# Patient Record
Sex: MALE | Race: ASIAN | NOT HISPANIC OR LATINO | URBAN - METROPOLITAN AREA
[De-identification: names, ages, dates, MRNs, and addresses within clinical notes are randomized per-mention and may not be internally consistent; named-entity substitution may affect disease eponyms.]

---

## 2017-01-20 ENCOUNTER — EMERGENCY (EMERGENCY)
Facility: HOSPITAL | Age: 57
LOS: 1 days | Discharge: HOME | End: 2017-01-20
Attending: EMERGENCY MEDICINE | Admitting: EMERGENCY MEDICINE
Payer: COMMERCIAL

## 2017-01-20 VITALS
SYSTOLIC BLOOD PRESSURE: 132 MMHG | DIASTOLIC BLOOD PRESSURE: 92 MMHG | TEMPERATURE: 98 F | OXYGEN SATURATION: 98 % | RESPIRATION RATE: 18 BRPM | HEART RATE: 73 BPM

## 2017-01-20 VITALS
SYSTOLIC BLOOD PRESSURE: 141 MMHG | HEART RATE: 74 BPM | OXYGEN SATURATION: 98 % | DIASTOLIC BLOOD PRESSURE: 87 MMHG | RESPIRATION RATE: 16 BRPM

## 2017-01-20 DIAGNOSIS — R91.1 SOLITARY PULMONARY NODULE: Chronic | ICD-10-CM

## 2017-01-20 LAB
ALBUMIN SERPL ELPH-MCNC: 4 G/DL — SIGNIFICANT CHANGE UP (ref 3.3–5)
ALP SERPL-CCNC: 90 U/L — SIGNIFICANT CHANGE UP (ref 40–120)
ALT FLD-CCNC: 13 U/L — SIGNIFICANT CHANGE UP (ref 4–41)
APTT BLD: 27.6 SEC — SIGNIFICANT CHANGE UP (ref 27.5–37.4)
AST SERPL-CCNC: 17 U/L — SIGNIFICANT CHANGE UP (ref 4–40)
BASE EXCESS BLDV CALC-SCNC: 6 MMOL/L — SIGNIFICANT CHANGE UP
BASOPHILS # BLD AUTO: 0.02 K/UL — SIGNIFICANT CHANGE UP (ref 0–0.2)
BASOPHILS NFR BLD AUTO: 0.4 % — SIGNIFICANT CHANGE UP (ref 0–2)
BILIRUB SERPL-MCNC: 0.2 MG/DL — SIGNIFICANT CHANGE UP (ref 0.2–1.2)
BLD GP AB SCN SERPL QL: NEGATIVE — SIGNIFICANT CHANGE UP
BLOOD GAS VENOUS - CREATININE: 0.92 MG/DL — SIGNIFICANT CHANGE UP (ref 0.5–1.3)
BUN SERPL-MCNC: 18 MG/DL — SIGNIFICANT CHANGE UP (ref 7–23)
CALCIUM SERPL-MCNC: 8.9 MG/DL — SIGNIFICANT CHANGE UP (ref 8.4–10.5)
CHLORIDE BLDV-SCNC: 107 MMOL/L — SIGNIFICANT CHANGE UP (ref 96–108)
CHLORIDE SERPL-SCNC: 104 MMOL/L — SIGNIFICANT CHANGE UP (ref 98–107)
CO2 SERPL-SCNC: 27 MMOL/L — SIGNIFICANT CHANGE UP (ref 22–31)
CREAT SERPL-MCNC: 0.94 MG/DL — SIGNIFICANT CHANGE UP (ref 0.5–1.3)
EOSINOPHIL # BLD AUTO: 0.08 K/UL — SIGNIFICANT CHANGE UP (ref 0–0.5)
EOSINOPHIL NFR BLD AUTO: 1.5 % — SIGNIFICANT CHANGE UP (ref 0–6)
GAS PNL BLDV: 137 MMOL/L — SIGNIFICANT CHANGE UP (ref 136–146)
GLUCOSE BLDV-MCNC: 105 — HIGH (ref 70–99)
GLUCOSE SERPL-MCNC: 107 MG/DL — HIGH (ref 70–99)
HCO3 BLDV-SCNC: 28 MMOL/L — HIGH (ref 20–27)
HCT VFR BLD CALC: 42.5 % — SIGNIFICANT CHANGE UP (ref 39–50)
HCT VFR BLDV CALC: 43.8 % — SIGNIFICANT CHANGE UP (ref 39–51)
HGB BLD-MCNC: 13.8 G/DL — SIGNIFICANT CHANGE UP (ref 13–17)
HGB BLDV-MCNC: 14.3 G/DL — SIGNIFICANT CHANGE UP (ref 13–17)
IMM GRANULOCYTES NFR BLD AUTO: 0.2 % — SIGNIFICANT CHANGE UP (ref 0–1.5)
INR BLD: 0.87 — SIGNIFICANT CHANGE UP (ref 0.87–1.18)
LACTATE BLDV-MCNC: 1.3 MMOL/L — SIGNIFICANT CHANGE UP (ref 0.5–2)
LYMPHOCYTES # BLD AUTO: 1.49 K/UL — SIGNIFICANT CHANGE UP (ref 1–3.3)
LYMPHOCYTES # BLD AUTO: 28.2 % — SIGNIFICANT CHANGE UP (ref 13–44)
MCHC RBC-ENTMCNC: 28.5 PG — SIGNIFICANT CHANGE UP (ref 27–34)
MCHC RBC-ENTMCNC: 32.5 % — SIGNIFICANT CHANGE UP (ref 32–36)
MCV RBC AUTO: 87.6 FL — SIGNIFICANT CHANGE UP (ref 80–100)
MONOCYTES # BLD AUTO: 0.39 K/UL — SIGNIFICANT CHANGE UP (ref 0–0.9)
MONOCYTES NFR BLD AUTO: 7.4 % — SIGNIFICANT CHANGE UP (ref 2–14)
NEUTROPHILS # BLD AUTO: 3.3 K/UL — SIGNIFICANT CHANGE UP (ref 1.8–7.4)
NEUTROPHILS NFR BLD AUTO: 62.3 % — SIGNIFICANT CHANGE UP (ref 43–77)
PCO2 BLDV: 53 MMHG — HIGH (ref 41–51)
PH BLDV: 7.38 PH — SIGNIFICANT CHANGE UP (ref 7.32–7.43)
PLATELET # BLD AUTO: 201 K/UL — SIGNIFICANT CHANGE UP (ref 150–400)
PMV BLD: 9.6 FL — SIGNIFICANT CHANGE UP (ref 7–13)
PO2 BLDV: 32 MMHG — LOW (ref 35–40)
POTASSIUM BLDV-SCNC: 3.8 MMOL/L — SIGNIFICANT CHANGE UP (ref 3.4–4.5)
POTASSIUM SERPL-MCNC: 4.2 MMOL/L — SIGNIFICANT CHANGE UP (ref 3.5–5.3)
POTASSIUM SERPL-SCNC: 4.2 MMOL/L — SIGNIFICANT CHANGE UP (ref 3.5–5.3)
PROT SERPL-MCNC: 7.1 G/DL — SIGNIFICANT CHANGE UP (ref 6–8.3)
PROTHROM AB SERPL-ACNC: 9.9 SEC — LOW (ref 10–13.1)
RBC # BLD: 4.85 M/UL — SIGNIFICANT CHANGE UP (ref 4.2–5.8)
RBC # FLD: 13.7 % — SIGNIFICANT CHANGE UP (ref 10.3–14.5)
RH IG SCN BLD-IMP: POSITIVE — SIGNIFICANT CHANGE UP
SAO2 % BLDV: 56.2 % — LOW (ref 60–85)
SODIUM SERPL-SCNC: 142 MMOL/L — SIGNIFICANT CHANGE UP (ref 135–145)
WBC # BLD: 5.29 K/UL — SIGNIFICANT CHANGE UP (ref 3.8–10.5)
WBC # FLD AUTO: 5.29 K/UL — SIGNIFICANT CHANGE UP (ref 3.8–10.5)

## 2017-01-20 PROCEDURE — 99284 EMERGENCY DEPT VISIT MOD MDM: CPT | Mod: 25

## 2017-01-20 PROCEDURE — 71250 CT THORAX DX C-: CPT | Mod: 26

## 2017-01-20 PROCEDURE — 71020: CPT | Mod: 26

## 2017-01-20 PROCEDURE — 93010 ELECTROCARDIOGRAM REPORT: CPT

## 2017-01-20 RX ORDER — CIPROFLOXACIN LACTATE 400MG/40ML
1 VIAL (ML) INTRAVENOUS
Qty: 10 | Refills: 0 | OUTPATIENT
Start: 2017-01-20 | End: 2017-01-30

## 2017-01-20 NOTE — ED ADULT TRIAGE NOTE - CHIEF COMPLAINT QUOTE
Pt c/o coughing up bright red blood since this AM. C/o mild left sided CP when breathing. Denies clots.  States hx Lung CA, had Lung sx for removal of L lower lung 9/30/16 and PNA in october 2016. Pt had bronchoscopy apprx 1 week ago. Denies fever/chills, SOB. Hx: gastric CA, Lung CA. Respirations even/unlabored. Pt c/o coughing up bright red blood since this AM. C/o mild left sided CP when breathing. Denies clots.  States hx Lung CA, had Lung sx for removal of L lower lung 9/30/16 and PNA in october 2016. Pt had bronchoscopy apprx 1 week ago. Denies fever/chills, SOB. Hx: gastric CA, Lung CA. Not currently on chemo. Respirations even/unlabored.

## 2017-01-20 NOTE — ED PROVIDER NOTE - MEDICAL DECISION MAKING DETAILS
57 y/o M with h/o non-small cell lung carcinoma s/p resection presents with hemoptysis. Likely related to patient's wedge resection and bronchoscopy. VSS. NAD. cbc, cmp, coags, CXR. CT surgery

## 2017-01-20 NOTE — ED ADULT NURSE NOTE - OBJECTIVE STATEMENT
Received pt in spot 18. [id number]. C/o a few episodes of coughing up bright red blood this AM. Pt states he went for bronchoscopy last week for persistent cough since lung resection (9/16). Pt states he has persistent pain at left lower lung resection site but denies increased pain today. Vs as noted. Denies sob, dizziness. VS as noted. IV placed, labs sent. Will monitor.

## 2017-01-20 NOTE — ED PROVIDER NOTE - OBJECTIVE STATEMENT
57 y/o M with h/o non-small cell carcinoma s/p LLL wedge resection 09/30/2016 presents with hemoptysis. Patient began having hemoptysis at 430 am. States that he has been having intermittent hemoptysis since his wedge resection but this is the largest amount. Also complaining of left sided chest pain which he has had since his surgery. Patient had a bronchoscopy done one week ago after he had CT showing recurrent nodule in LLL but biopsy was benign. PAtient's CT surgeon is Dr. Timo Ng at [facility].

## 2017-01-20 NOTE — ED ADULT NURSE REASSESSMENT NOTE - NS ED NURSE REASSESS COMMENT FT1
pt received from Joanna MARTÍNEZ in no acute distress. PT speaking full sentences with stable vitals, LS clear= bilat in no resp distress. will CTM

## 2017-01-20 NOTE — ED PROVIDER NOTE - PROGRESS NOTE DETAILS
Will order ct, signed out patient, plan to admit. [place] Alanis: Pt signed out to me at 8am. Plan to follow up ct chest results and follow up with ct surgery. Will continue to follow. [place] Alanis: Ct showing signs of possible PNA. Ct surgery recommends pt stable for discharge on levaquin 500 daily for 10 days and pt to follow up with Dr. jerilyn mccrary as soon as possible. Pt advised to return to the ED if symptoms worsen- worsening bleeding.

## 2017-01-20 NOTE — ED PROVIDER NOTE - ATTENDING CONTRIBUTION TO CARE
att55 y/o m with h/o Lung CA, s/p resection , no chemo, PNA, gastric CA, presents with hemoptysis for months, usually in the am. Increased amount of bright red blood with clot today. In the past took augmentin with improved hemoptysis. Pt had recent bronch. Pt with L sided chest pain since his surgery, was told it was post op pain. Well appearing. No bleeding in room. clear lungs. Plan for labs, xr, c/s ct surgery and reassessment. Plan to admit. Not consistent with pe with symptoms for months. Will sign out patient pending ct and ct surg c/s.   I have personally performed a face to face bedside history and physical examination of this patient. I have discussed the history, examination, review of systems, assessment and plan of management with the resident. I have reviewed the electronic medical record and amended it to reflect my history, review of systems, physical exam, assessment and plan.

## 2017-10-24 NOTE — ED ADULT NURSE NOTE - CHIEF COMPLAINT QUOTE
Pt c/o coughing up bright red blood since this AM. C/o mild left sided CP when breathing. Denies clots.  States hx Lung CA, had Lung sx for removal of L lower lung 9/30/16 and PNA in october 2016. Pt had bronchoscopy apprx 1 week ago. Denies fever/chills, SOB. Hx: gastric CA, Lung CA. Not currently on chemo. Respirations even/unlabored.
hair removal not indicated

## 2021-07-30 NOTE — ED PROVIDER NOTE - CHPI ED SYMPTOMS NEG
Secondary to severe anemia and bacteremia   iron transfusion   IV abx for bacteremia   Monitor on tele  TTE no fever/no chills

## 2022-08-03 ENCOUNTER — CONSULT (OUTPATIENT)
Dept: INTERNAL MEDICINE CLINIC | Facility: CLINIC | Age: 62
End: 2022-08-03
Payer: COMMERCIAL

## 2022-08-03 VITALS
WEIGHT: 164.2 LBS | HEIGHT: 72 IN | HEART RATE: 81 BPM | SYSTOLIC BLOOD PRESSURE: 100 MMHG | OXYGEN SATURATION: 98 % | DIASTOLIC BLOOD PRESSURE: 70 MMHG | BODY MASS INDEX: 22.24 KG/M2

## 2022-08-03 DIAGNOSIS — Z00.00 ANNUAL PHYSICAL EXAM: ICD-10-CM

## 2022-08-03 DIAGNOSIS — K22.719 BARRETT'S ESOPHAGUS WITH DYSPLASIA: Primary | ICD-10-CM

## 2022-08-03 DIAGNOSIS — C34.90 MALIGNANT NEOPLASM OF LUNG, UNSPECIFIED LATERALITY, UNSPECIFIED PART OF LUNG (HCC): ICD-10-CM

## 2022-08-03 DIAGNOSIS — Z11.59 NEED FOR HEPATITIS C SCREENING TEST: ICD-10-CM

## 2022-08-03 DIAGNOSIS — Z23 ENCOUNTER FOR IMMUNIZATION: ICD-10-CM

## 2022-08-03 DIAGNOSIS — R97.20 ELEVATED PSA: ICD-10-CM

## 2022-08-03 PROBLEM — K22.70 BARRETT ESOPHAGUS: Status: ACTIVE | Noted: 2022-08-03

## 2022-08-03 PROBLEM — Z85.028 HISTORY OF STOMACH CANCER: Status: ACTIVE | Noted: 2022-08-03

## 2022-08-03 PROCEDURE — 99386 PREV VISIT NEW AGE 40-64: CPT | Performed by: GENERAL ACUTE CARE HOSPITAL

## 2022-08-03 RX ORDER — PANTOPRAZOLE SODIUM 40 MG/1
40 TABLET, DELAYED RELEASE ORAL DAILY
COMMUNITY

## 2022-08-03 NOTE — PROGRESS NOTES
Assessment/Plan:    Annual physical exam  Routine blood works: ordered  Vaccines: had 2 doses of shingrix, had pneumonia vaccine (unclear which type), had 3 doses of pfizer covid vaccine  Colon cancer screen: had colonoscopy 2y ago  Smoking cessation: quit smokine in 2012  Healthy diet and regular exercise         Diagnoses and all orders for this visit:    Mathias's esophagus with dysplasia    Need for hepatitis C screening test  -     Hepatitis C Antibody ([facility]); Future    Encounter for immunization    Annual physical exam  -     CBC and differential; Future  -     Comprehensive metabolic panel; Future  -     Lipid panel; Future  -     PSA, Total Screen; Future  -     Hemoglobin A1C; Future  -     HIV 1/2 Antigen/Antibody (4th Generation) w Reflex [facility]; Future    Malignant neoplasm of lung, unspecified laterality, unspecified part of lung ([facility] [address] )    Other orders  -     pantoprazole (PROTONIX) 40 mg tablet; Take 40 mg by mouth daily  -     FAMOTIDINE PO; Take by mouth          Subjective:      Patient ID: Anish Aviles is a 58 y o  male  HPI    Establish care    Hx of stomach cancer in 2012, had surgery in [facility], had chemo, now in remission  Now getting yearly EGD  Last EGD in Jan 2021, showed Mathias  On PPI  Hx lung cancer, previous smoker, dx in 2016, s/p resection  Found met in 2019, s/p radiation and chemo  F/u with oncology in [facility]  Recent CT stable nodule  No symptoms  Feels well  Otherwise no major complain  The following portions of the patient's history were reviewed and updated as appropriate: allergies, past family history, past social history and past surgical history      Review of Systems      Objective:      /70 (BP Location: Left arm, Patient Position: Sitting, Cuff Size: Standard)   Pulse 81   Ht 6' (1 829 m)   Wt 74 5 kg (164 lb 3 2 oz)   SpO2 98%   BMI 22 27 kg/m²          Physical Exam

## 2022-08-03 NOTE — ASSESSMENT & PLAN NOTE
Detail Level: Detailed Routine blood works: ordered  Vaccines: had 2 doses of shingrix, had pneumonia vaccine (unclear which type), had 3 doses of pfizer covid vaccine  Colon cancer screen: had colonoscopy 2y ago     Smoking cessation: quit smokine in 2012  Healthy diet and regular exercise Add 67016 Cpt? (Important Note: In 2017 The Use Of 14371 Is Being Tracked By Cms To Determine Future Global Period Reimbursement For Global Periods): yes Wound Evaluated By: CHERI Condon Wound Evaluated By: CHEIR Condon

## 2022-08-08 ENCOUNTER — TELEPHONE (OUTPATIENT)
Dept: UROLOGY | Facility: MEDICAL CENTER | Age: 62
End: 2022-08-08

## 2022-08-08 ENCOUNTER — APPOINTMENT (OUTPATIENT)
Dept: LAB | Age: 62
End: 2022-08-08
Payer: COMMERCIAL

## 2022-08-08 DIAGNOSIS — Z00.00 ANNUAL PHYSICAL EXAM: ICD-10-CM

## 2022-08-08 DIAGNOSIS — Z11.59 NEED FOR HEPATITIS C SCREENING TEST: ICD-10-CM

## 2022-08-08 LAB
ALBUMIN SERPL BCP-MCNC: 3.8 G/DL (ref 3.5–5)
ALP SERPL-CCNC: 90 U/L (ref 46–116)
ALT SERPL W P-5'-P-CCNC: 23 U/L (ref 12–78)
ANION GAP SERPL CALCULATED.3IONS-SCNC: 4 MMOL/L (ref 4–13)
AST SERPL W P-5'-P-CCNC: 20 U/L (ref 5–45)
BASOPHILS # BLD AUTO: 0.03 THOUSANDS/ΜL (ref 0–0.1)
BASOPHILS NFR BLD AUTO: 1 % (ref 0–1)
BILIRUB SERPL-MCNC: 0.78 MG/DL (ref 0.2–1)
BUN SERPL-MCNC: 15 MG/DL (ref 5–25)
CALCIUM SERPL-MCNC: 9.3 MG/DL (ref 8.3–10.1)
CHLORIDE SERPL-SCNC: 105 MMOL/L (ref 96–108)
CHOLEST SERPL-MCNC: 158 MG/DL
CO2 SERPL-SCNC: 29 MMOL/L (ref 21–32)
CREAT SERPL-MCNC: 0.93 MG/DL (ref 0.6–1.3)
EOSINOPHIL # BLD AUTO: 0.08 THOUSAND/ΜL (ref 0–0.61)
EOSINOPHIL NFR BLD AUTO: 2 % (ref 0–6)
ERYTHROCYTE [DISTWIDTH] IN BLOOD BY AUTOMATED COUNT: 12.7 % (ref 11.6–15.1)
EST. AVERAGE GLUCOSE BLD GHB EST-MCNC: 120 MG/DL
GFR SERPL CREATININE-BSD FRML MDRD: 87 ML/MIN/1.73SQ M
GLUCOSE P FAST SERPL-MCNC: 100 MG/DL (ref 65–99)
HBA1C MFR BLD: 5.8 %
HCT VFR BLD AUTO: 44 % (ref 36.5–49.3)
HCV AB SER QL: NORMAL
HDLC SERPL-MCNC: 62 MG/DL
HGB BLD-MCNC: 14.3 G/DL (ref 12–17)
IMM GRANULOCYTES # BLD AUTO: 0.01 THOUSAND/UL (ref 0–0.2)
IMM GRANULOCYTES NFR BLD AUTO: 0 % (ref 0–2)
LDLC SERPL CALC-MCNC: 81 MG/DL (ref 0–100)
LYMPHOCYTES # BLD AUTO: 1.28 THOUSANDS/ΜL (ref 0.6–4.47)
LYMPHOCYTES NFR BLD AUTO: 26 % (ref 14–44)
MCH RBC QN AUTO: 29.9 PG (ref 26.8–34.3)
MCHC RBC AUTO-ENTMCNC: 32.5 G/DL (ref 31.4–37.4)
MCV RBC AUTO: 92 FL (ref 82–98)
MONOCYTES # BLD AUTO: 0.39 THOUSAND/ΜL (ref 0.17–1.22)
MONOCYTES NFR BLD AUTO: 8 % (ref 4–12)
NEUTROPHILS # BLD AUTO: 3.19 THOUSANDS/ΜL (ref 1.85–7.62)
NEUTS SEG NFR BLD AUTO: 63 % (ref 43–75)
NONHDLC SERPL-MCNC: 96 MG/DL
NRBC BLD AUTO-RTO: 0 /100 WBCS
PLATELET # BLD AUTO: 200 THOUSANDS/UL (ref 149–390)
PMV BLD AUTO: 9.8 FL (ref 8.9–12.7)
POTASSIUM SERPL-SCNC: 4.2 MMOL/L (ref 3.5–5.3)
PROT SERPL-MCNC: 6.9 G/DL (ref 6.4–8.4)
PSA SERPL-MCNC: 7.3 NG/ML (ref 0–4)
RBC # BLD AUTO: 4.79 MILLION/UL (ref 3.88–5.62)
SODIUM SERPL-SCNC: 138 MMOL/L (ref 135–147)
TRIGL SERPL-MCNC: 76 MG/DL
WBC # BLD AUTO: 4.98 THOUSAND/UL (ref 4.31–10.16)

## 2022-08-08 PROCEDURE — 83036 HEMOGLOBIN GLYCOSYLATED A1C: CPT

## 2022-08-08 PROCEDURE — 36415 COLL VENOUS BLD VENIPUNCTURE: CPT

## 2022-08-08 PROCEDURE — G0103 PSA SCREENING: HCPCS

## 2022-08-08 PROCEDURE — 80053 COMPREHEN METABOLIC PANEL: CPT

## 2022-08-08 PROCEDURE — 80061 LIPID PANEL: CPT

## 2022-08-08 PROCEDURE — 86803 HEPATITIS C AB TEST: CPT

## 2022-08-08 PROCEDURE — 87389 HIV-1 AG W/HIV-1&-2 AB AG IA: CPT

## 2022-08-08 PROCEDURE — 85025 COMPLETE CBC W/AUTO DIFF WBC: CPT

## 2022-08-08 NOTE — TELEPHONE ENCOUNTER
Patient calling for fax number  Patient is going to call his old urologist to get the records faxed to us      Informed patient of fax number

## 2022-08-08 NOTE — TELEPHONE ENCOUNTER
Please Triage  New Patient     What is the reason for the patients appointment? Elevated PSA    What office location does the patient prefer? [place]      Imaging/Lab Results: PSA of 7 3     Do we accept the patient's insurance or is the patient Self-Pay? [employer]     Has the patient had any previous Urologist(s)? In [place] he did, does not remember the name  He says he can try to get the records faxed over       Has the patient had any outside testing done? Yes - PSA previously was 4 3, then went to 3 8 (about a year ago), now he took a PSA today 8/8 and it was 7 3    Patient has a history of lung cancer and stomach cancer   He is currently monitoring his lung cancer, but is not having treatment    Patient's best call back is [phone]

## 2022-08-08 NOTE — TELEPHONE ENCOUNTER
Aaron Cazares and scheduled him an appointment with Dr Lori Thompson on 8/19 @ 10:15 - He is having information faxed and is aware of location

## 2022-08-09 LAB — HIV 1+2 AB+HIV1 P24 AG SERPL QL IA: NORMAL

## 2022-08-19 ENCOUNTER — OFFICE VISIT (OUTPATIENT)
Dept: UROLOGY | Facility: AMBULATORY SURGERY CENTER | Age: 62
End: 2022-08-19
Payer: COMMERCIAL

## 2022-08-19 VITALS
HEART RATE: 76 BPM | WEIGHT: 162 LBS | BODY MASS INDEX: 21.97 KG/M2 | SYSTOLIC BLOOD PRESSURE: 102 MMHG | OXYGEN SATURATION: 96 % | DIASTOLIC BLOOD PRESSURE: 74 MMHG

## 2022-08-19 DIAGNOSIS — R97.20 ELEVATED PSA: Primary | ICD-10-CM

## 2022-08-19 LAB
SL AMB  POCT GLUCOSE, UA: NORMAL
SL AMB LEUKOCYTE ESTERASE,UA: NORMAL
SL AMB POCT BILIRUBIN,UA: NORMAL
SL AMB POCT BLOOD,UA: NORMAL
SL AMB POCT CLARITY,UA: CLEAR
SL AMB POCT COLOR,UA: NORMAL
SL AMB POCT KETONES,UA: NORMAL
SL AMB POCT NITRITE,UA: NORMAL
SL AMB POCT PH,UA: 5
SL AMB POCT SPECIFIC GRAVITY,UA: 1.02
SL AMB POCT URINE PROTEIN: NORMAL
SL AMB POCT UROBILINOGEN: 0.2

## 2022-08-19 PROCEDURE — 99204 OFFICE O/P NEW MOD 45 MIN: CPT | Performed by: UROLOGY

## 2022-08-19 PROCEDURE — 81002 URINALYSIS NONAUTO W/O SCOPE: CPT | Performed by: UROLOGY

## 2022-08-19 RX ORDER — MULTIVIT WITH MINERALS/LUTEIN
1000 TABLET ORAL DAILY
COMMUNITY

## 2022-08-19 RX ORDER — OMEGA-3S/DHA/EPA/FISH OIL/D3 300MG-1000
400 CAPSULE ORAL DAILY
COMMUNITY

## 2022-08-19 RX ORDER — MULTIVIT-MIN/IRON FUM/FOLIC AC 7.5 MG-4
1 TABLET ORAL DAILY
COMMUNITY

## 2022-08-19 RX ORDER — CHLORAL HYDRATE 500 MG
1 CAPSULE ORAL
COMMUNITY

## 2022-08-19 NOTE — PROGRESS NOTES
8/19/2022    José Luis Espino  1960  [id number]        Assessment  Elevated PSA    Plan  We discussed findings  We discussed natural history of PSA and possibility of fluctuations  We discussed the PSA is not specific for prostate cancer but that is important to watch PSA trend  Recommend repeat PSA in about 1 month  If still concerning, will then consider MRI of the prostate for further characterization and possible biopsy  We also discussed natural history of prostate cancer especially in relation to his pre-existing lung cancer  All questions answered to his satisfaction and he agrees with the plan  History of Present Illness  Bebe Chung is a 58 y o  male pleasant gentleman who relocated from [place] where he to [place] in his [employer]  He worked as an  in [employer]  He was seeing a urologist for some mild urinary symptoms and borderline PSA  He reports that his PSA was around 3 8 to 4 3 range  This was as recently as 2021  However, PSA on 8/8/2022 was 7 3  He does have urinary symptoms including hesitancy especially with force void of the day, slower emptying over time, and need to double void  He denies any specific symptoms of UTI such as dysuria, hematuria, urgency, frequency  He has not had prostatitis  He also has history of stomach cancer in 2012 requiring partial gastrectomy, as well as lung cancer in 2016 requiring surgery, with recurrence in 2019 requiring radiation therapy  He no longer smokes  His father had history of BPH  AUA SYMPTOM SCORE    Flowsheet Row Most Recent Value   AUA SYMPTOM SCORE    How often have you had a sensation of not emptying your bladder completely after you finished urinating? 2   How often have you had to urinate again less than two hours after you finished urinating? 2   How often have you found you stopped and started again several times when you urinate? 2   How often have you found it difficult to postpone urination? 2   How often have you had a weak urinary stream? 2   How often have you had to push or strain to begin urination? 2   How many times did you most typically get up to urinate from the time you went to bed at night until the time you got up in the morning? 1   Quality of Life: If you were to spend the rest of your life with your urinary condition just the way it is now, how would you feel about that? 2   AUA SYMPTOM SCORE 13          Review of Systems  Review of Systems   Constitutional: Negative  HENT: Negative  Respiratory: Negative  Cardiovascular: Negative  Gastrointestinal: Negative  Genitourinary:        As per HPI   Musculoskeletal: Negative  Skin: Negative  Neurological: Negative  Hematological: Negative  Past Medical History  History reviewed  No pertinent past medical history      Past Social History  Past Surgical History:   Procedure Laterality Date    LUNG SURGERY  2016    STOMACH SURGERY  2012       Past Family History  Family History   Problem Relation Age of Onset    Benign prostatic hyperplasia Father     Heart disease Mother        Past Social history  Social History     Socioeconomic History    Marital status: /Civil Union     Spouse name: Not on file    Number of children: Not on file    Years of education: Not on file    Highest education level: Not on file   Occupational History    Not on file   Tobacco Use    Smoking status: Former Smoker     Packs/day: 1 00     Years: 30 00     Pack years: 30 00     Types: Cigarettes     Quit date: 2012     Years since quitting: 10 6    Smokeless tobacco: Never Used   Vaping Use    Vaping Use: Never used   Substance and Sexual Activity    Alcohol use: Yes     Comment: sometimes    Drug use: Never    Sexual activity: Yes     Partners: Female   Other Topics Concern    Not on file   Social History Narrative    Not on file     Social Determinants of Health     Financial Resource Strain: Not on file   Food Insecurity: Not on file   Transportation Needs: Not on file   Physical Activity: Not on file   Stress: Not on file   Social Connections: Not on file   Intimate Partner Violence: Not on file   Housing Stability: Not on file     Social History     Tobacco Use   Smoking Status Former Smoker    Packs/day: 1 00    Years: 30 00    Pack years: 30 00    Types: Cigarettes    Quit date: 2012    Years since quitting: 10 6   Smokeless Tobacco Never Used       Current Medications  Current Outpatient Medications   Medication Sig Dispense Refill    Ascorbic Acid (vitamin C) 1000 MG tablet Take 1,000 mg by mouth daily      cholecalciferol (VITAMIN D3) 400 units tablet Take 400 Units by mouth daily      FAMOTIDINE PO Take by mouth      Multiple Vitamins-Minerals (multivitamin with minerals) tablet Take 1 tablet by mouth daily      Omega-3 Fatty Acids (Omega-3 Fish Oil) 1000 MG CAPS Take 1 capsule by mouth      pantoprazole (PROTONIX) 40 mg tablet Take 40 mg by mouth daily       No current facility-administered medications for this visit  Allergies  No Known Allergies    Past Medical History, Social History, Family History, medications and allergies were reviewed  Vitals  Vitals:    08/19/22 1008   BP: 102/74   BP Location: Left arm   Patient Position: Sitting   Cuff Size: Adult   Pulse: 76   SpO2: 96%   Weight: 73 5 kg (162 lb)       Physical Exam  Physical Exam  Vitals reviewed  Constitutional:       Appearance: He is well-developed  HENT:      Head: Normocephalic and atraumatic  Eyes:      Conjunctiva/sclera: Conjunctivae normal    Cardiovascular:      Rate and Rhythm: Normal rate  Pulmonary:      Effort: Pulmonary effort is normal    Abdominal:      Palpations: Abdomen is soft  Genitourinary:     Comments: Prostate about 40 g, smooth, no palpable nodules  Musculoskeletal:         General: Normal range of motion  Skin:     General: Skin is warm and dry     Neurological:      Mental Status: He is alert and oriented to person, place, and time     Psychiatric:         Mood and Affect: Mood normal            Results  Lab Results   Component Value Date    PSA 7 3 (H) 08/08/2022     Lab Results   Component Value Date    CALCIUM 9 3 08/08/2022    K 4 2 08/08/2022    CO2 29 08/08/2022     08/08/2022    BUN 15 08/08/2022    CREATININE 0 93 08/08/2022     Lab Results   Component Value Date    WBC 4 98 08/08/2022    HGB 14 3 08/08/2022    HCT 44 0 08/08/2022    MCV 92 08/08/2022     08/08/2022

## 2022-10-10 ENCOUNTER — APPOINTMENT (OUTPATIENT)
Dept: LAB | Age: 62
End: 2022-10-10
Payer: COMMERCIAL

## 2022-10-10 DIAGNOSIS — R97.20 ELEVATED PSA: ICD-10-CM

## 2022-10-10 LAB — PSA SERPL-MCNC: 6.1 NG/ML (ref 0–4)

## 2022-10-10 PROCEDURE — 84153 ASSAY OF PSA TOTAL: CPT

## 2022-10-14 ENCOUNTER — OFFICE VISIT (OUTPATIENT)
Dept: UROLOGY | Facility: AMBULATORY SURGERY CENTER | Age: 62
End: 2022-10-14
Payer: COMMERCIAL

## 2022-10-14 VITALS
HEIGHT: 72 IN | WEIGHT: 166 LBS | HEART RATE: 79 BPM | DIASTOLIC BLOOD PRESSURE: 82 MMHG | SYSTOLIC BLOOD PRESSURE: 122 MMHG | BODY MASS INDEX: 22.48 KG/M2 | OXYGEN SATURATION: 95 %

## 2022-10-14 DIAGNOSIS — R97.20 ELEVATED PSA: Primary | ICD-10-CM

## 2022-10-14 PROCEDURE — 99214 OFFICE O/P EST MOD 30 MIN: CPT | Performed by: UROLOGY

## 2022-10-14 NOTE — PROGRESS NOTES
10/14/2022    Cirilo Amador  1960  [id number]        Assessment  Elevated PSA    Plan  We discussed his PSA trend overall  We also discussed screening for prostate cancer in general and expectations regarding PSA over time  We discussed that PSA is not specific for prostate cancer but can certainly indicate malignancy in the appropriate setting  We discussed options at this point including continued PSA monitoring, MRI of the prostate for further characterization, and possible random biopsy  After discussion of each option, he is most comfortable with proceeding with MRI  I agree the MRI is the most appropriate next step for him in light of his PSA and his history of 2 additional cancers of the stomach and lung  Will plan MRI and then discuss findings to determine whether biopsy is indicated  History of Present Illness  Madalyn Hearn is a 58 y o  male pleasant gentleman who relocated from [place] where he to [address] [place] [address] in his [employer]  He worked as an  in [place]  He was seeing a urologist for some mild urinary symptoms and borderline PSA  He reports that his PSA was around 3 8 to 4 3 range  This was as recently as 2021  However, PSA on 8/8/2022 was 7 3  He does have urinary symptoms including hesitancy especially with force void of the day, slower emptying over time, and need to double void  He denies any specific symptoms of UTI such as dysuria, hematuria, urgency, frequency  He has not had prostatitis      He also has history of stomach cancer in 2012 requiring partial gastrectomy, as well as lung cancer in 2016 requiring surgery, with recurrence in 2019 requiring radiation therapy      He no longer smokes  His father had history of BPH      Follow-up PSA 6 1  Review of Systems  Review of Systems   Constitutional: Negative  HENT: Negative  Respiratory: Negative  Cardiovascular: Negative  Gastrointestinal: Negative      Genitourinary:        As per HPI   Musculoskeletal: Negative  Skin: Negative  Neurological: Negative  Hematological: Negative  Past Medical History  History reviewed  No pertinent past medical history      Past Social History  Past Surgical History:   Procedure Laterality Date   • LUNG SURGERY  2016   • STOMACH SURGERY  2012       Past Family History  Family History   Problem Relation Age of Onset   • Benign prostatic hyperplasia Father    • Heart disease Mother        Past Social history  Social History     Socioeconomic History   • Marital status: /Civil Union     Spouse name: Not on file   • Number of children: Not on file   • Years of education: Not on file   • Highest education level: Not on file   Occupational History   • Not on file   Tobacco Use   • Smoking status: Former Smoker     Packs/day: 1 00     Years: 30 00     Pack years: 30 00     Types: Cigarettes     Quit date: 2012     Years since quitting: 10 7   • Smokeless tobacco: Never Used   Vaping Use   • Vaping Use: Never used   Substance and Sexual Activity   • Alcohol use: Yes     Comment: sometimes   • Drug use: Never   • Sexual activity: Yes     Partners: Female   Other Topics Concern   • Not on file   Social History Narrative   • Not on file     Social Determinants of Health     Financial Resource Strain: Not on file   Food Insecurity: Not on file   Transportation Needs: Not on file   Physical Activity: Not on file   Stress: Not on file   Social Connections: Not on file   Intimate Partner Violence: Not on file   Housing Stability: Not on file     Social History     Tobacco Use   Smoking Status Former Smoker   • Packs/day: 1 00   • Years: 30 00   • Pack years: 30 00   • Types: Cigarettes   • Quit date: 2012   • Years since quitting: 10 7   Smokeless Tobacco Never Used       Current Medications  Current Outpatient Medications   Medication Sig Dispense Refill   • Ascorbic Acid (vitamin C) 1000 MG tablet Take 1,000 mg by mouth daily     • cholecalciferol (VITAMIN D3) 400 units tablet Take 400 Units by mouth daily     • FAMOTIDINE PO Take by mouth     • Multiple Vitamins-Minerals (multivitamin with minerals) tablet Take 1 tablet by mouth daily     • Omega-3 Fatty Acids (Omega-3 Fish Oil) 1000 MG CAPS Take 1 capsule by mouth     • pantoprazole (PROTONIX) 40 mg tablet Take 40 mg by mouth daily       No current facility-administered medications for this visit  Allergies  No Known Allergies    Past Medical History, Social History, Family History, medications and allergies were reviewed      Vitals  Vitals:    10/14/22 1307   BP: 122/82   Pulse: 79   SpO2: 95%   Weight: 75 3 kg (166 lb)   Height: 6' (1 829 m)       Physical Exam  Physical Exam      Results  Lab Results   Component Value Date    PSA 6 1 (H) 10/10/2022    PSA 7 3 (H) 08/08/2022     Lab Results   Component Value Date    CALCIUM 9 3 08/08/2022    K 4 2 08/08/2022    CO2 29 08/08/2022     08/08/2022    BUN 15 08/08/2022    CREATININE 0 93 08/08/2022     Lab Results   Component Value Date    WBC 4 98 08/08/2022    HGB 14 3 08/08/2022    HCT 44 0 08/08/2022    MCV 92 08/08/2022     08/08/2022

## 2022-11-02 ENCOUNTER — HOSPITAL ENCOUNTER (OUTPATIENT)
Dept: RADIOLOGY | Age: 62
Discharge: HOME | End: 2022-11-02

## 2022-11-02 DIAGNOSIS — R97.20 ELEVATED PSA: ICD-10-CM

## 2022-11-02 RX ADMIN — GADOBUTROL 7 ML: 604.72 INJECTION INTRAVENOUS at 15:03

## 2022-11-03 ENCOUNTER — TELEPHONE (OUTPATIENT)
Dept: UROLOGY | Facility: AMBULATORY SURGERY CENTER | Age: 62
End: 2022-11-03

## 2022-11-03 DIAGNOSIS — R97.20 ELEVATED PSA: Primary | ICD-10-CM

## 2022-11-03 NOTE — TELEPHONE ENCOUNTER
Pt called and stated he viewed results from MRI and is asking if f/u appt is necessary    Pt call [phone]

## 2022-11-03 NOTE — TELEPHONE ENCOUNTER
Called and spoke with patient  Advised on AP's note and rescheduled patient out for 6 months  He is aware he needs a PSA level and new script was added to his chart

## 2022-11-03 NOTE — TELEPHONE ENCOUNTER
Results of recent multiparametric MRI of prostate revealed PI-RADS 2, this was reviewed with Dr Lucas Innocent   Patient may follow-up in 6 months with repeat PSA

## 2023-01-13 ENCOUNTER — OFFICE VISIT (OUTPATIENT)
Dept: GASTROENTEROLOGY | Facility: MEDICAL CENTER | Age: 63
End: 2023-01-13

## 2023-01-13 VITALS
HEART RATE: 71 BPM | WEIGHT: 167.4 LBS | BODY MASS INDEX: 22.7 KG/M2 | TEMPERATURE: 97.6 F | SYSTOLIC BLOOD PRESSURE: 98 MMHG | DIASTOLIC BLOOD PRESSURE: 62 MMHG

## 2023-01-13 DIAGNOSIS — Z85.028 HISTORY OF GASTRIC CANCER: Primary | ICD-10-CM

## 2023-01-13 DIAGNOSIS — K21.00 GASTROESOPHAGEAL REFLUX DISEASE WITH ESOPHAGITIS WITHOUT HEMORRHAGE: ICD-10-CM

## 2023-01-13 NOTE — PROGRESS NOTES
[facility] - Outpatient Consultation  Jenna Sampson 58 y o  male MRN: [id number]  Encounter: [id number]          ASSESSMENT AND PLAN:      1  History of gastric cancer  Previous medical history reviewed from 2012 to 2022  Patient had hx of gastric cancer s/p partial gastrectomy with recent questionable marin's esophagus, previous marin's esophagus diagnosis was in the setting of esophagitis  Will repeat EGD for surveillance and rule out marin's esophagus  - EGD; Future    2  Gastroesophageal reflux disease with esophagitis without hemorrhage  Previous biopsy showed Focal intestinal metaplasia in the setting of esophagitis on previous endoscopy  Continue PPI    Follow up after EGD    ______________________________________________________________________    HPI:    51-year-old man with past medical history of gastric cancer diagnosed in 2012 and primary lung cancer diagnosed in 2016 status post treatment in remission for both cancers presented to establish care  Patient was diagnosed with questionable Marin's esophagus based on previous endoscopies  His chart was reviewed since his diagnosis of gastric cancer in 2012 extensively, previous pathology biopsy of the endoscopy showed chronic gastritis  His most recent endoscopy in 2022 showed esophagitis and biopsy showed focal intestinal metaplasia  But on the physician's summary it showed Marin's esophagus  He has no symptoms of acid reflux but after his diagnosis of Marin's esophagus by his gastroenterologist in [place] he started taking pantoprazole in the morning and famotidine at night  His most colonoscopy was in 2022 and with negative findings  REVIEW OF SYSTEMS:    CONSTITUTIONAL: Denies any fever, chills, rigors, and weight loss  HEENT: No earache or tinnitus  Denies hearing loss or visual disturbances  CARDIOVASCULAR: No chest pain or palpitations     RESPIRATORY: Denies any cough, hemoptysis, shortness of breath or dyspnea on exertion  GASTROINTESTINAL: As noted in the History of Present Illness  GENITOURINARY: No problems with urination  Denies any hematuria or dysuria  NEUROLOGIC: No dizziness or vertigo, denies headaches  MUSCULOSKELETAL: Denies any muscle or joint pain  SKIN: Denies skin rashes or itching  ENDOCRINE: Denies excessive thirst  Denies intolerance to heat or cold  PSYCHOSOCIAL: Denies depression or anxiety  Denies any recent memory loss  Historical Information   History reviewed  No pertinent past medical history  Past Surgical History:   Procedure Laterality Date   • LUNG SURGERY     • STOMACH SURGERY       Social History   Social History     Substance and Sexual Activity   Alcohol Use Yes    Comment: sometimes     Social History     Substance and Sexual Activity   Drug Use Never     Social History     Tobacco Use   Smoking Status Former   • Packs/day: 1    • Years: 30    • Pack years:    • Types: Cigarettes   • Quit date:    • Years since quittin 0   Smokeless Tobacco Never     Family History   Problem Relation Age of Onset   • Benign prostatic hyperplasia Father    • Heart disease Mother        Meds/Allergies       Current Outpatient Medications:   •  Ascorbic Acid (vitamin C) 1000 MG tablet  •  FAMOTIDINE PO  •  Multiple Vitamins-Minerals (multivitamin with minerals) tablet  •  Omega-3 Fatty Acids (Omega-3 Fish Oil) 1000 MG CAPS  •  pantoprazole (PROTONIX) 40 mg tablet  •  cholecalciferol (VITAMIN D3) 400 units tablet    No Known Allergies        Objective     Blood pressure 98/62, pulse 71, temperature 97 6 °F (36 4 °C), temperature source Tympanic, weight 75 9 kg (167 lb 6 4 oz)  Body mass index is 22 7 kg/m²          PHYSICAL EXAM:      General Appearance:   Alert, cooperative, no distress   HEENT:   Normocephalic, atraumatic, anicteric      Neck:  Supple, symmetrical, trachea midline   Lungs:   Clear to auscultation bilaterally; no rales, rhonchi or wheezing; respirations unlabored    Heart[de-identified]   Regular rate and rhythm; no murmur, rub, or gallop  Abdomen:   Soft, non-tender, non-distended; normal bowel sounds; no masses, no organomegaly    Genitalia:   Deferred    Rectal:   Deferred    Extremities:  No cyanosis, clubbing or edema    Pulses:  2+ and symmetric    Skin:  No jaundice, rashes, or lesions    Lymph nodes:  No palpable cervical lymphadenopathy        Lab Results:   No visits with results within 1 Day(s) from this visit  Latest known visit with results is:   Appointment on 10/10/2022   Component Date Value   • PSA, Diagnostic 10/10/2022 6 1 (H)          Radiology Results:   No results found

## 2023-01-16 ENCOUNTER — PATIENT MESSAGE (OUTPATIENT)
Dept: GASTROENTEROLOGY | Facility: MEDICAL CENTER | Age: 63
End: 2023-01-16

## 2023-01-16 DIAGNOSIS — K21.00 GASTROESOPHAGEAL REFLUX DISEASE WITH ESOPHAGITIS WITHOUT HEMORRHAGE: Primary | ICD-10-CM

## 2023-01-16 RX ORDER — FAMOTIDINE 40 MG/1
40 TABLET, FILM COATED ORAL
Qty: 30 TABLET | Refills: 2 | Status: SHIPPED | OUTPATIENT
Start: 2023-01-16 | End: 2023-02-15

## 2023-01-16 RX ORDER — PANTOPRAZOLE SODIUM 40 MG/1
40 TABLET, DELAYED RELEASE ORAL DAILY
Qty: 30 TABLET | Refills: 2 | Status: SHIPPED | OUTPATIENT
Start: 2023-01-16 | End: 2023-02-15

## 2023-01-16 NOTE — TELEPHONE ENCOUNTER
----- Message from Sanjiv Georges sent at 1/16/2023 12:02 PM EST -----  Regarding: Famotidine prescription   Contact: [phone]  Have you sent the prescription to the pharmacy?

## 2023-01-16 NOTE — TELEPHONE ENCOUNTER
----- Message from Arcadio Bonds sent at 1/16/2023 12:02 PM EST -----  Regarding: Famotidine prescription   Contact: [phone]  Have you sent the prescription to the pharmacy?

## 2023-01-18 ENCOUNTER — TELEPHONE (OUTPATIENT)
Dept: GASTROENTEROLOGY | Facility: CLINIC | Age: 63
End: 2023-01-18

## 2023-01-18 NOTE — TELEPHONE ENCOUNTER
----- Message from Brian Vela MD sent at 1/18/2023  1:17 PM EST -----  Laverne Khan, I reviewed his previous reports during visit, I noticed he and his wife's scopes were not scheduled yet  Can you ask them to schedule for the procedures? Thanks  rBian Vela   ----- Message -----  From: Allen Helton RN  Sent: 1/16/2023   3:06 PM EST  To: Brian Vela MD    Past colonoscopy report for your review     ----- Message -----  From: Fercho Alejandro  Sent: 1/16/2023   2:44 PM EST  To: [facility]

## 2023-01-19 NOTE — TELEPHONE ENCOUNTER
Scheduled date of EGD(as of today):4/20/23    Physician performing EGD:Dr Clifton    Location of EGD:[facility]    Clearances: N/A

## 2023-04-19 RX ORDER — ONDANSETRON 2 MG/ML
4 INJECTION INTRAMUSCULAR; INTRAVENOUS EVERY 6 HOURS PRN
Status: CANCELLED | OUTPATIENT
Start: 2023-04-19

## 2023-04-19 RX ORDER — SODIUM CHLORIDE 9 MG/ML
125 INJECTION, SOLUTION INTRAVENOUS CONTINUOUS
Status: CANCELLED | OUTPATIENT
Start: 2023-04-19

## 2023-04-20 ENCOUNTER — HOSPITAL ENCOUNTER (OUTPATIENT)
Dept: GASTROENTEROLOGY | Facility: MEDICAL CENTER | Age: 63
Setting detail: OUTPATIENT SURGERY
Discharge: HOME | End: 2023-04-20
Attending: INTERNAL MEDICINE

## 2023-04-20 VITALS
DIASTOLIC BLOOD PRESSURE: 54 MMHG | SYSTOLIC BLOOD PRESSURE: 92 MMHG | HEART RATE: 61 BPM | WEIGHT: 167 LBS | BODY MASS INDEX: 22.62 KG/M2 | HEIGHT: 72 IN | TEMPERATURE: 98.1 F | OXYGEN SATURATION: 96 % | RESPIRATION RATE: 18 BRPM

## 2023-04-20 DIAGNOSIS — Z85.028 HISTORY OF GASTRIC CANCER: ICD-10-CM

## 2023-04-20 PROBLEM — Z85.118 HISTORY OF LUNG CANCER: Status: ACTIVE | Noted: 2023-04-20

## 2023-04-20 PROBLEM — C80.1 CANCER (HCC): Status: ACTIVE | Noted: 2023-04-20

## 2023-04-20 RX ORDER — ONDANSETRON 2 MG/ML
4 INJECTION INTRAMUSCULAR; INTRAVENOUS EVERY 6 HOURS PRN
Status: DISCONTINUED | OUTPATIENT
Start: 2023-04-20 | End: 2023-04-24 | Stop reason: HOSPADM

## 2023-04-20 RX ORDER — SODIUM CHLORIDE 9 MG/ML
125 INJECTION, SOLUTION INTRAVENOUS CONTINUOUS
Status: DISCONTINUED | OUTPATIENT
Start: 2023-04-20 | End: 2023-04-24 | Stop reason: HOSPADM

## 2023-04-20 RX ORDER — SIMETHICONE 20 MG/.3ML
EMULSION ORAL AS NEEDED
Status: COMPLETED | OUTPATIENT
Start: 2023-04-20 | End: 2023-04-20

## 2023-04-20 RX ADMIN — Medication 40 MG: at 07:38

## 2023-04-20 RX ADMIN — SODIUM CHLORIDE 125 ML/HR: 0.9 INJECTION, SOLUTION INTRAVENOUS at 07:13

## 2023-04-20 NOTE — H&P
History and Physical - [facility]  Carl Rodriguez 58 y o  male MRN: [id number]                  HPI: Carl Rodriguez is a 58y o  year old male who presents for hx of gastric cancer      REVIEW OF SYSTEMS: Per the HPI, and otherwise unremarkable      Historical Information   Past Medical History:   Diagnosis Date   • Cancer [facility] )     gastric   • Cancer ([facility] )     Lung   • Colon polyp      Past Surgical History:   Procedure Laterality Date   • COLONOSCOPY     • IR PORT PLACEMENT     • IR PORT REMOVAL     • LUNG SURGERY  2016   • STOMACH SURGERY       Social History   Social History     Substance and Sexual Activity   Alcohol Use Yes    Comment: sometimes     Social History     Substance and Sexual Activity   Drug Use Never     Social History     Tobacco Use   Smoking Status Former   • Packs/day:    • Years: 30    • Pack years:    • Types: Cigarettes   • Quit date:    • Years since quittin 3   Smokeless Tobacco Never     Family History   Problem Relation Age of Onset   • Benign prostatic hyperplasia Father    • Heart disease Mother        Meds/Allergies       Current Outpatient Medications:   •  Ascorbic Acid (vitamin C) 1000 MG tablet  •  famotidine (PEPCID) 40 MG tablet  •  Multiple Vitamins-Minerals (multivitamin with minerals) tablet  •  Omega-3 Fatty Acids (Omega-3 Fish Oil) 1000 MG CAPS  •  pantoprazole (PROTONIX) 40 mg tablet  •  cholecalciferol (VITAMIN D3) 400 units tablet    Current Facility-Administered Medications:   •  ondansetron (ZOFRAN) injection 4 mg, 4 mg, Intravenous, Q6H PRN  •  sodium chloride 0 9 % infusion, 125 mL/hr, Intravenous, Continuous, 125 mL/hr at 23 0713    No Known Allergies    Objective     /79   Pulse 64   Temp 98 1 °F (36 7 °C) (Temporal)   Resp 16   Ht 6' (1 829 m)   Wt 75 8 kg (167 lb)   SpO2 98%   BMI 22 65 kg/m²       PHYSICAL EXAM    Gen: NAD  Head: NCAT  CV: RRR  CHEST: Clear  ABD: soft, NT/ND  EXT: no edema      ASSESSMENT/PLAN:  This is a 58y o  year old male here for hx of gastric cancer, and he is stable and optimized for his procedure

## 2023-04-26 ENCOUNTER — TELEPHONE (OUTPATIENT)
Dept: GASTROENTEROLOGY | Facility: AMBULARY SURGERY CENTER | Age: 63
End: 2023-04-26

## 2023-04-26 NOTE — TELEPHONE ENCOUNTER
Patients GI provider:  Dr Geraldene Kayser    Number to return call: [phone]    Reason for call: Pt calling requesting to speak with someone to go over the results of EGD     Scheduled procedure/appointment date if applicable: Apt/procedure 7-5-2023

## 2023-06-06 DIAGNOSIS — R73.03 PREDIABETES: ICD-10-CM

## 2023-06-06 DIAGNOSIS — R97.20 ELEVATED PSA: ICD-10-CM

## 2023-06-06 DIAGNOSIS — Z00.00 ANNUAL PHYSICAL EXAM: Primary | ICD-10-CM

## 2023-06-07 ENCOUNTER — APPOINTMENT (OUTPATIENT)
Dept: LAB | Age: 63
End: 2023-06-07
Payer: COMMERCIAL

## 2023-06-07 DIAGNOSIS — Z00.00 ANNUAL PHYSICAL EXAM: ICD-10-CM

## 2023-06-07 DIAGNOSIS — R97.20 ELEVATED PSA: ICD-10-CM

## 2023-06-07 DIAGNOSIS — R73.03 PREDIABETES: ICD-10-CM

## 2023-06-07 LAB
ANION GAP SERPL CALCULATED.3IONS-SCNC: -2 MMOL/L (ref 4–13)
BASOPHILS # BLD AUTO: 0.02 THOUSANDS/ÂΜL (ref 0–0.1)
BASOPHILS NFR BLD AUTO: 0 % (ref 0–1)
BUN SERPL-MCNC: 14 MG/DL (ref 5–25)
CALCIUM SERPL-MCNC: 9.2 MG/DL (ref 8.3–10.1)
CHLORIDE SERPL-SCNC: 110 MMOL/L (ref 96–108)
CHOLEST SERPL-MCNC: 135 MG/DL
CO2 SERPL-SCNC: 31 MMOL/L (ref 21–32)
CREAT SERPL-MCNC: 1.08 MG/DL (ref 0.6–1.3)
EOSINOPHIL # BLD AUTO: 0.13 THOUSAND/ÂΜL (ref 0–0.61)
EOSINOPHIL NFR BLD AUTO: 2 % (ref 0–6)
ERYTHROCYTE [DISTWIDTH] IN BLOOD BY AUTOMATED COUNT: 12.5 % (ref 11.6–15.1)
EST. AVERAGE GLUCOSE BLD GHB EST-MCNC: 111 MG/DL
GFR SERPL CREATININE-BSD FRML MDRD: 73 ML/MIN/1.73SQ M
GLUCOSE P FAST SERPL-MCNC: 108 MG/DL (ref 65–99)
HBA1C MFR BLD: 5.5 %
HCT VFR BLD AUTO: 42.9 % (ref 36.5–49.3)
HDLC SERPL-MCNC: 56 MG/DL
HGB BLD-MCNC: 14 G/DL (ref 12–17)
IMM GRANULOCYTES # BLD AUTO: 0 THOUSAND/UL (ref 0–0.2)
IMM GRANULOCYTES NFR BLD AUTO: 0 % (ref 0–2)
LDLC SERPL CALC-MCNC: 63 MG/DL (ref 0–100)
LYMPHOCYTES # BLD AUTO: 1.45 THOUSANDS/ÂΜL (ref 0.6–4.47)
LYMPHOCYTES NFR BLD AUTO: 26 % (ref 14–44)
MCH RBC QN AUTO: 30.1 PG (ref 26.8–34.3)
MCHC RBC AUTO-ENTMCNC: 32.6 G/DL (ref 31.4–37.4)
MCV RBC AUTO: 92 FL (ref 82–98)
MONOCYTES # BLD AUTO: 0.37 THOUSAND/ÂΜL (ref 0.17–1.22)
MONOCYTES NFR BLD AUTO: 7 % (ref 4–12)
NEUTROPHILS # BLD AUTO: 3.53 THOUSANDS/ÂΜL (ref 1.85–7.62)
NEUTS SEG NFR BLD AUTO: 65 % (ref 43–75)
NONHDLC SERPL-MCNC: 79 MG/DL
NRBC BLD AUTO-RTO: 0 /100 WBCS
PLATELET # BLD AUTO: 188 THOUSANDS/UL (ref 149–390)
PMV BLD AUTO: 10 FL (ref 8.9–12.7)
POTASSIUM SERPL-SCNC: 4.8 MMOL/L (ref 3.5–5.3)
PSA SERPL-MCNC: 5.98 NG/ML (ref 0–4)
RBC # BLD AUTO: 4.65 MILLION/UL (ref 3.88–5.62)
SODIUM SERPL-SCNC: 139 MMOL/L (ref 135–147)
TRIGL SERPL-MCNC: 80 MG/DL
WBC # BLD AUTO: 5.5 THOUSAND/UL (ref 4.31–10.16)

## 2023-06-07 PROCEDURE — 83036 HEMOGLOBIN GLYCOSYLATED A1C: CPT

## 2023-06-07 PROCEDURE — G0103 PSA SCREENING: HCPCS

## 2023-06-07 PROCEDURE — 80048 BASIC METABOLIC PNL TOTAL CA: CPT

## 2023-06-07 PROCEDURE — 85025 COMPLETE CBC W/AUTO DIFF WBC: CPT

## 2023-06-07 PROCEDURE — 36415 COLL VENOUS BLD VENIPUNCTURE: CPT

## 2023-06-07 PROCEDURE — 80061 LIPID PANEL: CPT

## 2023-08-07 ENCOUNTER — OFFICE VISIT (OUTPATIENT)
Dept: INTERNAL MEDICINE CLINIC | Facility: CLINIC | Age: 63
End: 2023-08-07
Payer: COMMERCIAL

## 2023-08-07 VITALS
HEIGHT: 72 IN | OXYGEN SATURATION: 97 % | DIASTOLIC BLOOD PRESSURE: 70 MMHG | SYSTOLIC BLOOD PRESSURE: 110 MMHG | WEIGHT: 168.2 LBS | HEART RATE: 66 BPM | BODY MASS INDEX: 22.78 KG/M2

## 2023-08-07 DIAGNOSIS — R97.20 PSA ELEVATION: ICD-10-CM

## 2023-08-07 DIAGNOSIS — Z00.00 ANNUAL PHYSICAL EXAM: Primary | ICD-10-CM

## 2023-08-07 DIAGNOSIS — R21 RASH: ICD-10-CM

## 2023-08-07 DIAGNOSIS — C34.90 MALIGNANT NEOPLASM OF LUNG, UNSPECIFIED LATERALITY, UNSPECIFIED PART OF LUNG (HCC): ICD-10-CM

## 2023-08-07 PROCEDURE — 99396 PREV VISIT EST AGE 40-64: CPT | Performed by: GENERAL ACUTE CARE HOSPITAL

## 2023-08-07 RX ORDER — KETOCONAZOLE 20 MG/G
CREAM TOPICAL DAILY
Qty: 15 G | Refills: 0 | Status: SHIPPED | OUTPATIENT
Start: 2023-08-07

## 2023-08-07 NOTE — PATIENT INSTRUCTIONS
Ask your GI in [facility] when you are due for next colonoscopy  Get blood work in Dec for PSA

## 2023-08-07 NOTE — PROGRESS NOTES
Name: Lona Lopez      : 1960      MRN: [id number]  Encounter Provider: Valeria Junior MD  Encounter Date: 2023   [facility]    Assessment & Plan     1. Annual physical exam  Assessment & Plan:  Routine blood works: reviewed  Vaccines:UTD on covid vaccine (bivalent booster received this year), shginrix, tdap, pneumonia vaccine (done at [facility]). Colon cancer screen: f/u with GI. Had colonoscopy in   Prostate cancer screen:mildly elevated psa saw urology, had prostate MRI unremarkable, continue to check PSA q6mon  Smoking cessation: quit   Healthy diet and regular exercise          2. PSA elevation  -     PSA, Total Screen; Future    3. Rash  -     ketoconazole (NIZORAL) 2 % cream; Apply topically daily    4. Malignant neoplasm of lung, unspecified laterality, unspecified part of lung ([address])         Subjective      HPI  Review of Systems   Constitutional: Negative for chills, fatigue and fever. HENT: Negative for congestion, nosebleeds, postnasal drip, sneezing, sore throat and trouble swallowing. Eyes: Negative for pain. Respiratory: Negative for cough, chest tightness, shortness of breath and wheezing. Cardiovascular: Negative for chest pain, palpitations and leg swelling. Gastrointestinal: Negative for abdominal pain, constipation, diarrhea, nausea and vomiting. Endocrine: Negative for cold intolerance, heat intolerance, polydipsia and polyuria. Genitourinary: Negative for difficulty urinating, dysuria, flank pain and hematuria. Musculoskeletal: Negative for arthralgias and myalgias. Skin: Negative for rash. Neurological: Negative for dizziness, tremors, weakness and headaches. Hematological: Does not bruise/bleed easily. Psychiatric/Behavioral: Negative for confusion and dysphoric mood. The patient is not nervous/anxious.         Current Outpatient Medications on File Prior to Visit   Medication Sig   • Ascorbic Acid (vitamin C) 1000 MG tablet Take 1,000 mg by mouth daily   • Multiple Vitamins-Minerals (multivitamin with minerals) tablet Take 1 tablet by mouth daily   • Omega-3 Fatty Acids (Omega-3 Fish Oil) 1000 MG CAPS Take 1 capsule by mouth   • pantoprazole (PROTONIX) 40 mg tablet Take 1 tablet (40 mg total) by mouth daily for 30 doses   • [DISCONTINUED] cholecalciferol (VITAMIN D3) 400 units tablet Take 400 Units by mouth daily   • [DISCONTINUED] famotidine (PEPCID) 40 MG tablet Take 1 tablet (40 mg total) by mouth daily at bedtime for 30 doses       Objective     /70 (BP Location: Left arm, Patient Position: Sitting, Cuff Size: Standard)   Pulse 66   Ht 6' (1.829 m)   Wt 76.3 kg (168 lb 3.2 oz)   SpO2 97%   BMI 22.81 kg/m²     Physical Exam  Constitutional:       General: He is not in acute distress. Appearance: He is well-developed. HENT:      Head: Normocephalic and atraumatic. Right Ear: External ear normal.      Left Ear: External ear normal.   Eyes:      General: No scleral icterus. Conjunctiva/sclera: Conjunctivae normal.   Neck:      Thyroid: No thyromegaly. Trachea: No tracheal deviation. Cardiovascular:      Rate and Rhythm: Normal rate and regular rhythm. Heart sounds: Normal heart sounds. Pulmonary:      Effort: Pulmonary effort is normal. No respiratory distress. Breath sounds: Normal breath sounds. No wheezing or rales. Abdominal:      General: Bowel sounds are normal.      Palpations: Abdomen is soft. Tenderness: There is no abdominal tenderness. There is no guarding or rebound. Musculoskeletal:      Cervical back: Normal range of motion and neck supple. Lymphadenopathy:      Cervical: No cervical adenopathy. Neurological:      Mental Status: He is alert and oriented to person, place, and time. Psychiatric:         Behavior: Behavior normal.         Thought Content:  Thought content normal.         Judgment: Judgment normal.       Valeria Junior MD

## 2023-08-07 NOTE — ASSESSMENT & PLAN NOTE
Routine blood works: reviewed  Vaccines:UTD on covid vaccine (bivalent booster received this year), shginrix, tdap, pneumonia vaccine (done at [facility]). Colon cancer screen: f/u with GI.  Had colonoscopy in 2020  Prostate cancer screen:mildly elevated psa saw urology, had prostate MRI unremarkable, continue to check PSA q6mon  Smoking cessation: quit 2012  Healthy diet and regular exercise

## 2023-08-08 ENCOUNTER — TELEPHONE (OUTPATIENT)
Dept: INTERNAL MEDICINE CLINIC | Facility: CLINIC | Age: 63
End: 2023-08-08

## 2023-08-08 DIAGNOSIS — Z85.028 HISTORY OF STOMACH CANCER: Primary | ICD-10-CM

## 2023-08-08 NOTE — TELEPHONE ENCOUNTER
----- Message from Tiny White MD sent at 8/7/2023  1:50 PM EDT -----  Regarding: RE: GI follow up  Hi,    I am covering for Dr. Chandan Ferreira (she is away for the next 2-3 weeks). He should follow-up with her for yearly EGDs. Please let me know if I can help. Sincerely,  Aline Co  ----- Message -----  From: Talha More MD  Sent: 8/7/2023   9:52 AM EDT  To: Demarco Keller MD  Subject: GI follow up                                     Hi Dr. Chandan Ferreira,     This pt has hx of stomach cancer in 2016. Saw you this year had EGD. He has questions about follow up, whether he needs to see you yearly for EGD to f/u his cancer. If so, I can let him know to make appointment with you. Thank you.      Marck Vanegas

## 2023-09-06 DIAGNOSIS — K21.00 GASTROESOPHAGEAL REFLUX DISEASE WITH ESOPHAGITIS WITHOUT HEMORRHAGE: ICD-10-CM

## 2023-09-06 RX ORDER — PANTOPRAZOLE SODIUM 40 MG/1
40 TABLET, DELAYED RELEASE ORAL DAILY
Qty: 90 TABLET | Refills: 1 | Status: SHIPPED | OUTPATIENT
Start: 2023-09-06 | End: 2023-10-06

## 2023-11-02 ENCOUNTER — TELEPHONE (OUTPATIENT)
Age: 63
End: 2023-11-02

## 2023-11-02 NOTE — TELEPHONE ENCOUNTER
Pt called in stating he went to the lab for blood work on 6/7/23 and the insurance claim was partially approved. He says he called his insurance company and the billing office and they said the labs aren't medically necessary and in order to be approved it has be preventative. He was told the doctor has to re-write the prescription showing it's preventaive or medically necessary. [facility]

## 2023-11-03 NOTE — TELEPHONE ENCOUNTER
Spoke to Manjit Knox, per the billing department labs same as last year and they were paid. Manjit Knox will be calling the insurance and calling our office to speak to me for the call. Please patch through.

## 2023-11-16 DIAGNOSIS — K21.9 GASTROESOPHAGEAL REFLUX DISEASE WITHOUT ESOPHAGITIS: ICD-10-CM

## 2023-11-16 DIAGNOSIS — K21.9 GASTROESOPHAGEAL REFLUX DISEASE WITHOUT ESOPHAGITIS: Primary | ICD-10-CM

## 2023-11-16 RX ORDER — SUCRALFATE ORAL 1 G/10ML
1 SUSPENSION ORAL 3 TIMES DAILY
Qty: 414 ML | Refills: 1 | Status: SHIPPED | OUTPATIENT
Start: 2023-11-16 | End: 2023-11-17

## 2023-11-17 RX ORDER — SUCRALFATE ORAL 1 G/10ML
1 SUSPENSION ORAL 3 TIMES DAILY
Qty: 420 ML | Refills: 1 | Status: SHIPPED | OUTPATIENT
Start: 2023-11-17

## 2023-12-12 PROBLEM — K76.89 HEPATIC CYST: Status: ACTIVE | Noted: 2023-12-12

## 2023-12-12 NOTE — PROGRESS NOTES
Name: Vincent Smith      : 1960      MRN: [id number]  Encounter Provider: Kevin Meraz MD  Encounter Date: 2023   [facility]: [facility]    Assessment & Plan     1. Encounter for immunization           Subjective      HPI  Review of Systems    Current Outpatient Medications on File Prior to Visit   Medication Sig    Ascorbic Acid (vitamin C) 1000 MG tablet Take 1,000 mg by mouth daily    ketoconazole (NIZORAL) 2 % cream Apply topically daily    Multiple Vitamins-Minerals (multivitamin with minerals) tablet Take 1 tablet by mouth daily    Omega-3 Fatty Acids (Omega-3 Fish Oil) 1000 MG CAPS Take 1 capsule by mouth    pantoprazole (PROTONIX) 40 mg tablet Take 1 tablet (40 mg total) by mouth daily for 30 doses    sucralfate (CARAFATE) 1 g/10 mL suspension TAKE 10 ML (1 G TOTAL) BY MOUTH 3 (THREE) TIMES A DAY       Objective     There were no vitals taken for this visit.     Physical Exam  Kevin Meraz MD

## 2023-12-13 ENCOUNTER — DOCUMENTATION (OUTPATIENT)
Dept: INTERNAL MEDICINE CLINIC | Facility: CLINIC | Age: 63
End: 2023-12-13

## 2023-12-13 ENCOUNTER — TELEMEDICINE (OUTPATIENT)
Dept: INTERNAL MEDICINE CLINIC | Facility: CLINIC | Age: 63
End: 2023-12-13
Payer: COMMERCIAL

## 2023-12-13 VITALS — BODY MASS INDEX: 21.97 KG/M2 | WEIGHT: 162 LBS

## 2023-12-13 DIAGNOSIS — C34.90 MALIGNANT NEOPLASM OF LUNG, UNSPECIFIED LATERALITY, UNSPECIFIED PART OF LUNG (HCC): ICD-10-CM

## 2023-12-13 DIAGNOSIS — E04.1 THYROID NODULE: ICD-10-CM

## 2023-12-13 DIAGNOSIS — Z23 ENCOUNTER FOR IMMUNIZATION: Primary | ICD-10-CM

## 2023-12-13 DIAGNOSIS — K76.89 HEPATIC CYST: ICD-10-CM

## 2023-12-13 PROCEDURE — 99213 OFFICE O/P EST LOW 20 MIN: CPT | Performed by: GENERAL ACUTE CARE HOSPITAL

## 2023-12-13 NOTE — ASSESSMENT & PLAN NOTE
This is being followed by oncology at [facility]. Stable lung nodule over the past year. We reviewed his several CT reports. Continue to follow-up with oncology.

## 2023-12-13 NOTE — PROGRESS NOTES
Virtual Regular Visit    Verification of patient location:    Patient is located at Home in the following state in which I hold an active license [place]      Assessment/Plan:    Problem List Items Addressed This Visit       Lung cancer [facility])     This is being followed by oncology at [facility]. Stable lung nodule over the past year. We reviewed his several CT reports. Continue to follow-up with oncology. Hepatic cyst     Reviewed CT report  His oncology team in [facility] ordered PET3-month. Will follow-up         Thyroid nodule     Incidental finding on his chest CT. We discussed getting thyroid ultrasound. Since he has a PET scan ordered in 3 months we can hold off on ultrasound and follow-up on the PET scan          Other Visit Diagnoses       Encounter for immunization    -  Primary                 Reason for visit is   Chief Complaint   Patient presents with    Virtual Regular Visit     To review CT scan    Virtual Regular Visit          Encounter provider Terra Johnson MD    Provider located at [address] [place] [id number]      Recent Visits  No visits were found meeting these conditions. Showing recent visits within past 7 days and meeting all other requirements  Today's Visits  Date Type Provider Dept   12/13/23 Telemedicine Terra Johnson MD [address] today's visits and meeting all other requirements  Future Appointments  No visits were found meeting these conditions. Showing future appointments within next 150 days and meeting all other requirements       The patient was identified by name and date of birth. Kenisha Gagnon was informed that this is a telemedicine visit and that the visit is being conducted through the [employer]. He agrees to proceed. .  My office door was closed. No one else was in the room. He acknowledged consent and understanding of privacy and security of the video platform.  The patient has agreed to participate and understands they can discontinue the visit at any time. Patient is aware this is a billable service. Kelsie Lee is a 61 y.o. male  . HPI     Patient recently had chest CT done for cancer surveillance. He has some questions about CT findings. Past Medical History:   Diagnosis Date    Cancer ([address])     gastric    Cancer ([address])     Lung    Colon polyp     GERD (gastroesophageal reflux disease)        Past Surgical History:   Procedure Laterality Date    COLONOSCOPY      IR PORT PLACEMENT      IR PORT REMOVAL      LUNG SURGERY  2016    STOMACH SURGERY  2012       Current Outpatient Medications   Medication Sig Dispense Refill    Ascorbic Acid (vitamin C) 1000 MG tablet Take 1,000 mg by mouth daily      ketoconazole (NIZORAL) 2 % cream Apply topically daily 15 g 0    Multiple Vitamins-Minerals (multivitamin with minerals) tablet Take 1 tablet by mouth daily      Omega-3 Fatty Acids (Omega-3 Fish Oil) 1000 MG CAPS Take 1 capsule by mouth      pantoprazole (PROTONIX) 40 mg tablet Take 1 tablet (40 mg total) by mouth daily for 30 doses 90 tablet 1    sucralfate (CARAFATE) 1 g/10 mL suspension TAKE 10 ML (1 G TOTAL) BY MOUTH 3 (THREE) TIMES A  mL 1     No current facility-administered medications for this visit.         No Known Allergies    Review of Systems    Video Exam    Vitals:    12/13/23 0853   Weight: 73.5 kg (162 lb)       Physical Exam     Visit Time  Total Visit Duration: 15

## 2023-12-13 NOTE — ASSESSMENT & PLAN NOTE
Reviewed CT report  His oncology team in [facility] ordered PET3-month.   Will follow-up

## 2023-12-13 NOTE — ASSESSMENT & PLAN NOTE
Incidental finding on his chest CT. We discussed getting thyroid ultrasound.   Since he has a PET scan ordered in 3 months we can hold off on ultrasound and follow-up on the PET scan

## 2024-03-25 ENCOUNTER — PATIENT MESSAGE (OUTPATIENT)
Dept: UROLOGY | Facility: AMBULATORY SURGERY CENTER | Age: 64
End: 2024-03-25

## 2024-03-25 ENCOUNTER — TELEPHONE (OUTPATIENT)
Dept: UROLOGY | Facility: AMBULATORY SURGERY CENTER | Age: 64
End: 2024-03-25

## 2024-03-25 NOTE — TELEPHONE ENCOUNTER
Please call patient and let him know that he should make a follow up appointment with us since he has not been seen since 2022. He should also plan to recheck his PSA level prior to the appointment. There is an order in and should be checked in the middle of June. As far as his PET scan goes it appears that the dye used for the PET scan was excreted through his urine and that's what was showing up on the results.

## 2024-05-20 ENCOUNTER — PATIENT MESSAGE (OUTPATIENT)
Dept: GASTROENTEROLOGY | Facility: MEDICAL CENTER | Age: 64
End: 2024-05-20

## 2024-05-21 ENCOUNTER — PREP FOR PROCEDURE (OUTPATIENT)
Dept: GASTROENTEROLOGY | Facility: MEDICAL CENTER | Age: 64
End: 2024-05-21

## 2024-05-21 DIAGNOSIS — Z85.028 HISTORY OF GASTRIC CANCER: Primary | ICD-10-CM

## 2024-06-17 DIAGNOSIS — Z00.6 ENCOUNTER FOR EXAMINATION FOR NORMAL COMPARISON OR CONTROL IN CLINICAL RESEARCH PROGRAM: ICD-10-CM

## 2024-06-18 ENCOUNTER — TELEPHONE (OUTPATIENT)
Dept: GASTROENTEROLOGY | Facility: CLINIC | Age: 64
End: 2024-06-18

## 2024-06-18 NOTE — TELEPHONE ENCOUNTER
Scheduled date of EGD(as of today): 08/28/24  Physician performing EGD: YOBANY  Location of EGD: [facility]  Instructions reviewed with patient by: CHARLES  Clearances: NONE

## 2024-07-11 ENCOUNTER — APPOINTMENT (OUTPATIENT)
Dept: LAB | Age: 64
End: 2024-07-11

## 2024-07-11 DIAGNOSIS — Z00.6 ENCOUNTER FOR EXAMINATION FOR NORMAL COMPARISON OR CONTROL IN CLINICAL RESEARCH PROGRAM: ICD-10-CM

## 2024-07-11 PROCEDURE — 36415 COLL VENOUS BLD VENIPUNCTURE: CPT

## 2024-08-06 ENCOUNTER — TELEPHONE (OUTPATIENT)
Age: 64
End: 2024-08-06

## 2024-08-06 NOTE — TELEPHONE ENCOUNTER
Rescheduled    Scheduled date of EGD(as of today): 10-  Physician performing EGD: Dr. Clifton  Location of EGD:  [facility]  Instructions reviewed with patient by: SHEEBA  Clearances: N/A

## 2024-09-03 ENCOUNTER — TELEPHONE (OUTPATIENT)
Age: 64
End: 2024-09-03

## 2024-09-03 DIAGNOSIS — Z00.00 ANNUAL PHYSICAL EXAM: Primary | ICD-10-CM

## 2024-09-03 PROBLEM — C80.1 CANCER (HCC): Status: RESOLVED | Noted: 2023-04-20 | Resolved: 2024-09-03

## 2024-09-03 NOTE — TELEPHONE ENCOUNTER
Patients spouse called asking if pt should have lab work done before his upcoming physical?    Please advise

## 2024-09-15 LAB
ALBUMIN SERPL-MCNC: 4.3 G/DL (ref 3.6–5.1)
ALBUMIN/GLOB SERPL: 1.7 (CALC) (ref 1–2.5)
ALP SERPL-CCNC: 85 U/L (ref 35–144)
ALT SERPL-CCNC: 12 U/L (ref 9–46)
AST SERPL-CCNC: 17 U/L (ref 10–35)
BASOPHILS # BLD AUTO: 20 CELLS/UL (ref 0–200)
BASOPHILS NFR BLD AUTO: 0.4 %
BILIRUB SERPL-MCNC: 1 MG/DL (ref 0.2–1.2)
BUN SERPL-MCNC: 20 MG/DL (ref 7–25)
BUN/CREAT SERPL: ABNORMAL (CALC) (ref 6–22)
CALCIUM SERPL-MCNC: 8.9 MG/DL (ref 8.6–10.3)
CHLORIDE SERPL-SCNC: 104 MMOL/L (ref 98–110)
CHOLEST SERPL-MCNC: 178 MG/DL
CHOLEST/HDLC SERPL: 2.8 (CALC)
CO2 SERPL-SCNC: 31 MMOL/L (ref 20–32)
CREAT SERPL-MCNC: 0.96 MG/DL (ref 0.7–1.35)
EOSINOPHIL # BLD AUTO: 39 CELLS/UL (ref 15–500)
EOSINOPHIL NFR BLD AUTO: 0.8 %
ERYTHROCYTE [DISTWIDTH] IN BLOOD BY AUTOMATED COUNT: 12.3 % (ref 11–15)
GFR/BSA.PRED SERPLBLD CYS-BASED-ARV: 88 ML/MIN/1.73M2
GLOBULIN SER CALC-MCNC: 2.6 G/DL (CALC) (ref 1.9–3.7)
GLUCOSE SERPL-MCNC: 100 MG/DL (ref 65–99)
HBA1C MFR BLD: 5.8 % OF TOTAL HGB
HCT VFR BLD AUTO: 44.7 % (ref 38.5–50)
HDLC SERPL-MCNC: 64 MG/DL
HGB BLD-MCNC: 14.4 G/DL (ref 13.2–17.1)
LDLC SERPL CALC-MCNC: 95 MG/DL (CALC)
LYMPHOCYTES # BLD AUTO: 1686 CELLS/UL (ref 850–3900)
LYMPHOCYTES NFR BLD AUTO: 34.4 %
MCH RBC QN AUTO: 30.5 PG (ref 27–33)
MCHC RBC AUTO-ENTMCNC: 32.2 G/DL (ref 32–36)
MCV RBC AUTO: 94.7 FL (ref 80–100)
MONOCYTES # BLD AUTO: 333 CELLS/UL (ref 200–950)
MONOCYTES NFR BLD AUTO: 6.8 %
NEUTROPHILS # BLD AUTO: 2822 CELLS/UL (ref 1500–7800)
NEUTROPHILS NFR BLD AUTO: 57.6 %
NONHDLC SERPL-MCNC: 114 MG/DL (CALC)
PLATELET # BLD AUTO: 187 THOUSAND/UL (ref 140–400)
PMV BLD REES-ECKER: 9.5 FL (ref 7.5–12.5)
POTASSIUM SERPL-SCNC: 4.5 MMOL/L (ref 3.5–5.3)
PROT SERPL-MCNC: 6.9 G/DL (ref 6.1–8.1)
PSA SERPL-MCNC: 5.44 NG/ML
RBC # BLD AUTO: 4.72 MILLION/UL (ref 4.2–5.8)
SODIUM SERPL-SCNC: 141 MMOL/L (ref 135–146)
TRIGL SERPL-MCNC: 97 MG/DL
WBC # BLD AUTO: 4.9 THOUSAND/UL (ref 3.8–10.8)

## 2024-09-18 ENCOUNTER — OFFICE VISIT (OUTPATIENT)
Dept: INTERNAL MEDICINE CLINIC | Facility: CLINIC | Age: 64
End: 2024-09-18
Payer: COMMERCIAL

## 2024-09-18 VITALS
HEART RATE: 67 BPM | TEMPERATURE: 98.1 F | OXYGEN SATURATION: 96 % | SYSTOLIC BLOOD PRESSURE: 100 MMHG | BODY MASS INDEX: 23.13 KG/M2 | DIASTOLIC BLOOD PRESSURE: 70 MMHG | HEIGHT: 71 IN | WEIGHT: 165.2 LBS

## 2024-09-18 DIAGNOSIS — Z23 ENCOUNTER FOR IMMUNIZATION: Primary | ICD-10-CM

## 2024-09-18 DIAGNOSIS — R97.20 ELEVATED PSA: ICD-10-CM

## 2024-09-18 DIAGNOSIS — R09.82 POST-NASAL DRIP: ICD-10-CM

## 2024-09-18 DIAGNOSIS — Z00.00 ANNUAL PHYSICAL EXAM: ICD-10-CM

## 2024-09-18 DIAGNOSIS — R73.03 PREDIABETES: ICD-10-CM

## 2024-09-18 PROCEDURE — 99396 PREV VISIT EST AGE 40-64: CPT | Performed by: GENERAL ACUTE CARE HOSPITAL

## 2024-09-18 NOTE — PROGRESS NOTES
Ambulatory Visit  Name: Clinton Wright      : 1960      MRN: [id number]  Encounter Provider: Kari Jones MD  Encounter Date: 2024   Enc[facility]: [facility]    Assessment & Plan  Encounter for immunization         Annual physical exam  Routine blood works: reviewed  Vaccines:rec covid vax and flu shot, UTD on shginrix, tdap, pneumonia vaccine (done at [facility]).   Colon cancer screen: f/u with GI. Had colonoscopy in   Prostate cancer screen:mildly elevated psa saw urology, had prostate MRI unremarkable, continue to check PSA q6mon  Lung cancer scrren: hx lung ca followed by [facility]  Smoking cessation: quit   Healthy diet and regular exercise         Post-nasal drip  Saw ent in the past  Has tried sinus wash and flonas         Elevated PSA  Saw uro in the past  Had MRI done unremarkable  Continue check psa yearly  Some mild BPH symptoms would like to hold off on med.          Prediabetes  Continue life style modification  Monitor lab            History of Present Illness     HPI      Review of Systems   Constitutional:  Negative for chills, fatigue and fever.   HENT:  Negative for congestion, nosebleeds, postnasal drip, sneezing, sore throat and trouble swallowing.    Eyes:  Negative for pain.   Respiratory:  Negative for cough, chest tightness, shortness of breath and wheezing.    Cardiovascular:  Negative for chest pain, palpitations and leg swelling.   Gastrointestinal:  Negative for abdominal pain, constipation, diarrhea, nausea and vomiting.   Endocrine: Negative for cold intolerance, heat intolerance, polydipsia and polyuria.   Genitourinary:  Negative for difficulty urinating, dysuria, flank pain and hematuria.   Musculoskeletal:  Negative for arthralgias and myalgias.   Skin:  Negative for rash.   Neurological:  Negative for dizziness, tremors, weakness and headaches.   Hematological:  Does not bruise/bleed easily.   Psychiatric/Behavioral:  Negative for confusion  "and dysphoric mood. The patient is not nervous/anxious.            Objective     /70 (BP Location: Left arm, Patient Position: Sitting, Cuff Size: Adult)   Pulse 67   Temp 98.1 °F (36.7 °C) (Probe)   Ht 5' 11\" (1.803 m)   Wt 74.9 kg (165 lb 3.2 oz)   SpO2 96%   BMI 23.04 kg/m²     Physical Exam  Constitutional:       General: He is not in acute distress.     Appearance: He is well-developed.   HENT:      Head: Normocephalic and atraumatic.      Right Ear: External ear normal.      Left Ear: External ear normal.   Eyes:      General: No scleral icterus.     Conjunctiva/sclera: Conjunctivae normal.   Neck:      Thyroid: No thyromegaly.      Trachea: No tracheal deviation.   Cardiovascular:      Rate and Rhythm: Normal rate and regular rhythm.      Heart sounds: Normal heart sounds.   Pulmonary:      Effort: Pulmonary effort is normal. No respiratory distress.      Breath sounds: Normal breath sounds. No wheezing or rales.   Abdominal:      General: Bowel sounds are normal.      Palpations: Abdomen is soft.      Tenderness: There is no abdominal tenderness. There is no guarding or rebound.   Musculoskeletal:      Cervical back: Normal range of motion and neck supple.   Lymphadenopathy:      Cervical: No cervical adenopathy.   Neurological:      Mental Status: He is alert and oriented to person, place, and time.   Psychiatric:         Behavior: Behavior normal.         Thought Content: Thought content normal.         Judgment: Judgment normal.         "

## 2024-09-18 NOTE — ASSESSMENT & PLAN NOTE
Saw uro in the past  Had MRI done unremarkable  Continue check psa yearly  Some mild BPH symptoms would like to hold off on med.

## 2024-09-18 NOTE — ASSESSMENT & PLAN NOTE
Routine blood works: reviewed  Vaccines:rec covid vax and flu shot, UTD on shginrix, tdap, [place] vaccine ([place] [facility]   Colon cancer screen: f/u with GI. Had colonoscopy in 2020  Prostate cancer screen:mildly elevated psa saw urology, had prostate MRI unremarkable, continue to check PSA q6mon  Lung cancer scrren: hx lung ca [place] [facility]  Smoking cessation: quit 2012  Healthy diet and regular exercise

## 2024-09-24 ENCOUNTER — TELEPHONE (OUTPATIENT)
Dept: GASTROENTEROLOGY | Facility: MEDICAL CENTER | Age: 64
End: 2024-09-24

## 2024-09-24 NOTE — TELEPHONE ENCOUNTER
Procedure Confirmation sent to patient via [facility] for patient to confirm, will attach the prep instructions for patient to review

## 2024-10-15 LAB
APOB+LDLR+PCSK9 GENE MUT ANL BLD/T: NOT DETECTED
BRCA1+BRCA2 DEL+DUP + FULL MUT ANL BLD/T: NOT DETECTED
MLH1+MSH2+MSH6+PMS2 GN DEL+DUP+FUL M: NOT DETECTED

## 2024-10-21 RX ORDER — SODIUM CHLORIDE, SODIUM LACTATE, POTASSIUM CHLORIDE, CALCIUM CHLORIDE 600; 310; 30; 20 MG/100ML; MG/100ML; MG/100ML; MG/100ML
125 INJECTION, SOLUTION INTRAVENOUS CONTINUOUS
Status: CANCELLED | OUTPATIENT
Start: 2024-10-21

## 2024-10-22 ENCOUNTER — HOSPITAL ENCOUNTER (OUTPATIENT)
Dept: GASTROENTEROLOGY | Facility: HOSPITAL | Age: 64
Setting detail: OUTPATIENT SURGERY
Discharge: HOME | End: 2024-10-22
Attending: INTERNAL MEDICINE
Payer: COMMERCIAL

## 2024-10-22 ENCOUNTER — ANESTHESIA (OUTPATIENT)
Dept: GASTROENTEROLOGY | Facility: HOSPITAL | Age: 64
End: 2024-10-22
Payer: COMMERCIAL

## 2024-10-22 ENCOUNTER — ANESTHESIA EVENT (OUTPATIENT)
Dept: GASTROENTEROLOGY | Facility: HOSPITAL | Age: 64
End: 2024-10-22
Payer: COMMERCIAL

## 2024-10-22 VITALS
OXYGEN SATURATION: 99 % | HEART RATE: 86 BPM | SYSTOLIC BLOOD PRESSURE: 107 MMHG | RESPIRATION RATE: 18 BRPM | DIASTOLIC BLOOD PRESSURE: 71 MMHG | TEMPERATURE: 97 F

## 2024-10-22 DIAGNOSIS — Z85.028 HISTORY OF GASTRIC CANCER: ICD-10-CM

## 2024-10-22 PROCEDURE — 88305 TISSUE EXAM BY PATHOLOGIST: CPT | Performed by: STUDENT IN AN ORGANIZED HEALTH CARE EDUCATION/TRAINING PROGRAM

## 2024-10-22 PROCEDURE — 88342 IMHCHEM/IMCYTCHM 1ST ANTB: CPT | Performed by: STUDENT IN AN ORGANIZED HEALTH CARE EDUCATION/TRAINING PROGRAM

## 2024-10-22 RX ORDER — PROPOFOL 10 MG/ML
INJECTION, EMULSION INTRAVENOUS AS NEEDED
Status: DISCONTINUED | OUTPATIENT
Start: 2024-10-22 | End: 2024-10-22

## 2024-10-22 RX ORDER — LIDOCAINE HYDROCHLORIDE 20 MG/ML
INJECTION, SOLUTION EPIDURAL; INFILTRATION; INTRACAUDAL; PERINEURAL AS NEEDED
Status: DISCONTINUED | OUTPATIENT
Start: 2024-10-22 | End: 2024-10-22

## 2024-10-22 RX ORDER — SODIUM CHLORIDE, SODIUM LACTATE, POTASSIUM CHLORIDE, CALCIUM CHLORIDE 600; 310; 30; 20 MG/100ML; MG/100ML; MG/100ML; MG/100ML
125 INJECTION, SOLUTION INTRAVENOUS CONTINUOUS
Status: DISCONTINUED | OUTPATIENT
Start: 2024-10-22 | End: 2024-10-26 | Stop reason: HOSPADM

## 2024-10-22 RX ADMIN — PROPOFOL 150 MG: 10 INJECTION, EMULSION INTRAVENOUS at 11:55

## 2024-10-22 RX ADMIN — PROPOFOL 50 MG: 10 INJECTION, EMULSION INTRAVENOUS at 12:01

## 2024-10-22 RX ADMIN — SODIUM CHLORIDE, SODIUM LACTATE, POTASSIUM CHLORIDE, AND CALCIUM CHLORIDE 125 ML/HR: .6; .31; .03; .02 INJECTION, SOLUTION INTRAVENOUS at 11:24

## 2024-10-22 RX ADMIN — Medication 40 MG: at 11:59

## 2024-10-22 RX ADMIN — LIDOCAINE HYDROCHLORIDE 100 MG: 20 INJECTION, SOLUTION EPIDURAL; INFILTRATION; INTRACAUDAL at 11:55

## 2024-10-22 NOTE — ANESTHESIA POSTPROCEDURE EVALUATION
Post-Op Assessment Note    CV Status:  Stable  Pain Score: 0    Pain management: adequate       Mental Status:  Sleepy   Hydration Status:  Euvolemic   PONV Controlled:  Controlled   Airway Patency:  Patent     Post Op Vitals Reviewed: Yes    No anethesia notable event occurred.    Staff: CRNA       Last Filed PACU Vitals:  Vitals Value Taken Time   Temp 97.1 °F (36.2 °C) 10/22/24 1208   Pulse 67 10/22/24 1208   /62 10/22/24 1208   Resp 16 10/22/24 1208   SpO2 97 % 10/22/24 1208       Modified [place]:  Activity: 0 (10/22/2024 12:08 PM)  Respiration: 2 (10/22/2024 12:08 PM)  Circulation: 1 (10/22/2024 12:08 PM)  Consciousness: 0 (10/22/2024 12:08 PM)  Oxygen Saturation: 2 (10/22/2024 12:08 PM)  Modified [place] Score: 5 (10/22/2024 12:08 PM)

## 2024-10-22 NOTE — ANESTHESIA POSTPROCEDURE EVALUATION
Post-Op Assessment Note    CV Status:  Stable    Pain management: adequate       Mental Status:  Awake   Hydration Status:  Stable   PONV Controlled:  None   Airway Patency:  Patent     Post Op Vitals Reviewed: Yes    No anethesia notable event occurred.    Staff: Anesthesiologist           Last Filed PACU Vitals:  Vitals Value Taken Time   Temp 97 °F (36.1 °C) 10/22/24 1223   Pulse 86 10/22/24 1223   /71 10/22/24 1223   Resp 18 10/22/24 1223   SpO2 99 % 10/22/24 1223       Modified [place]:  Activity: 2 (10/22/2024 12:25 PM)  Respiration: 2 (10/22/2024 12:25 PM)  Circulation: 2 (10/22/2024 12:25 PM)  Consciousness: 2 (10/22/2024 12:25 PM)  Oxygen Saturation: 2 (10/22/2024 12:25 PM)  Modified [place] Score: 10 (10/22/2024 12:25 PM)

## 2024-10-22 NOTE — ANESTHESIA POSTPROCEDURE EVALUATION
Post-Op Assessment Note    Last Filed PACU Vitals:  Vitals Value Taken Time   Temp 97.1 °F (36.2 °C) 10/22/24 1208   Pulse 67 10/22/24 1208   /62 10/22/24 1208   Resp 16 10/22/24 1208   SpO2 97 % 10/22/24 1208       Modified [place]:  Activity: 0 (10/22/2024 12:08 PM)  Respiration: 2 (10/22/2024 12:08 PM)  Circulation: 1 (10/22/2024 12:08 PM)  Consciousness: 0 (10/22/2024 12:08 PM)  Oxygen Saturation: 2 (10/22/2024 12:08 PM)  Modified [place] Score: 5 (10/22/2024 12:08 PM)

## 2024-10-22 NOTE — ANESTHESIA PREPROCEDURE EVALUATION
Procedure:  EGD    Relevant Problems   GI/HEPATIC   (+) Hepatic cyst      FEN/Gastrointestinal   (+) Mathias esophagus        Physical Exam    Airway    Mallampati score: I  TM Distance: >3 FB  Neck ROM: full     Dental   No notable dental hx     Cardiovascular      Pulmonary      Other Findings        Anesthesia Plan  ASA Score- 2     Anesthesia Type- IV sedation with anesthesia with ASA Monitors.         Additional Monitors:     Airway Plan:            Plan Factors-Exercise tolerance (METS): >4 METS.    Chart reviewed.    Patient summary reviewed.    Patient is not a current smoker.      Obstructive sleep apnea risk education given perioperatively.        Induction- intravenous.    Postoperative Plan-     Perioperative Resuscitation Plan - Level 1 - Full Code.       Informed Consent- Anesthetic plan and risks discussed with patient.  I personally reviewed this patient with the CRNA. Discussed and agreed on the Anesthesia Plan with the CRNA..

## 2024-10-22 NOTE — H&P
History and Physical - [facility]  Clinton Wright 64 y.o. male MRN: [id number]                  HPI: Clinton Wright is a 64 y.o. year old male who presents for esophagitis       REVIEW OF SYSTEMS: Per the HPI, and otherwise unremarkable.    Historical Information   Past Medical History:   Diagnosis Date    Cancer (HCC)     gastric    Cancer (HCC)     Lung    Colon polyp     GERD (gastroesophageal reflux disease)      Past Surgical History:   Procedure Laterality Date    COLONOSCOPY      IR PORT PLACEMENT      IR PORT REMOVAL      LUNG SURGERY  2016    STOMACH SURGERY       Social History   Social History     Substance and Sexual Activity   Alcohol Use Yes    Comment: sometimes     Social History     Substance and Sexual Activity   Drug Use Never     Social History     Tobacco Use   Smoking Status Former    Current packs/day: 0.00    Average packs/day: 1 pack/day for 30.0 years (30.0 ttl pk-yrs)    Types: Cigarettes    Start date: 1982    Quit date: 2012    Years since quittin.8   Smokeless Tobacco Never     Family History   Problem Relation Age of Onset    Benign prostatic hyperplasia Father     Heart disease Mother        Meds/Allergies       Current Outpatient Medications:     Ascorbic Acid (vitamin C) 1000 MG tablet    ketoconazole (NIZORAL) 2 % cream    Multiple Vitamins-Minerals (multivitamin with minerals) tablet    Omega-3 Fatty Acids (Omega-3 Fish Oil) 1000 MG CAPS    pantoprazole (PROTONIX) 40 mg tablet    sucralfate (CARAFATE) 1 g/10 mL suspension    Current Facility-Administered Medications:     lactated ringers infusion, 125 mL/hr, Intravenous, Continuous, 125 mL/hr at 10/22/24 1124    No Known Allergies    Objective     /73   Pulse 80   Temp 97.8 °F (36.6 °C) (Temporal)   Resp 16   SpO2 100%       PHYSICAL EXAM    Gen: NAD  Head: NCAT  CV: RRR  CHEST: Clear  ABD: soft, NT/ND  EXT: no edema      ASSESSMENT/PLAN:  This is a 64 y.o. year old male here for  esophagitis, and he is stable and optimized for his procedure.

## 2024-10-29 ENCOUNTER — TELEPHONE (OUTPATIENT)
Age: 64
End: 2024-10-29

## 2024-10-29 PROCEDURE — 88342 IMHCHEM/IMCYTCHM 1ST ANTB: CPT | Performed by: STUDENT IN AN ORGANIZED HEALTH CARE EDUCATION/TRAINING PROGRAM

## 2024-10-29 PROCEDURE — 88305 TISSUE EXAM BY PATHOLOGIST: CPT | Performed by: STUDENT IN AN ORGANIZED HEALTH CARE EDUCATION/TRAINING PROGRAM

## 2024-10-29 NOTE — TELEPHONE ENCOUNTER
Patients GI provider:  Dr. Clifton    Number to return call: [phone]    Reason for call: Pt calling for results of EGD. Pt would also like to know when he should have next colonoscopy, last one was 11/16/2020 no recommendations for recall. Please have Dr. Clifton give pt call to discuss.    Scheduled procedure/appointment date if applicable: N/A

## 2024-11-04 DIAGNOSIS — K21.00 GASTROESOPHAGEAL REFLUX DISEASE WITH ESOPHAGITIS WITHOUT HEMORRHAGE: ICD-10-CM

## 2024-11-04 RX ORDER — PANTOPRAZOLE SODIUM 40 MG/1
40 TABLET, DELAYED RELEASE ORAL DAILY
Qty: 90 TABLET | Refills: 1 | Status: SHIPPED | OUTPATIENT
Start: 2024-11-04

## 2024-11-04 NOTE — TELEPHONE ENCOUNTER
Pt calling to f/u on this. Read doc's comments out to pt. Pt confirmed understanding. Would like script sent over to pharmacy for antacid. Please send.

## 2024-11-12 DIAGNOSIS — U07.1 COVID: ICD-10-CM

## 2024-11-12 DIAGNOSIS — J06.9 UPPER RESPIRATORY TRACT INFECTION, UNSPECIFIED TYPE: Primary | ICD-10-CM

## 2024-11-12 DIAGNOSIS — J11.1 INFLUENZA: ICD-10-CM

## 2024-11-12 RX ORDER — NIRMATRELVIR AND RITONAVIR 300-100 MG
3 KIT ORAL 2 TIMES DAILY
Qty: 30 TABLET | Refills: 0 | Status: SHIPPED | OUTPATIENT
Start: 2024-11-12 | End: 2024-11-17

## 2024-11-12 RX ORDER — AZITHROMYCIN 250 MG/1
TABLET, FILM COATED ORAL
Qty: 6 TABLET | Refills: 0 | Status: SHIPPED | OUTPATIENT
Start: 2024-11-12 | End: 2024-11-17

## 2024-11-12 RX ORDER — OSELTAMIVIR PHOSPHATE 75 MG/1
75 CAPSULE ORAL EVERY 12 HOURS SCHEDULED
Qty: 10 CAPSULE | Refills: 0 | Status: SHIPPED | OUTPATIENT
Start: 2024-11-12 | End: 2024-11-17

## 2025-06-10 ENCOUNTER — OFFICE VISIT (OUTPATIENT)
Dept: INTERNAL MEDICINE CLINIC | Facility: CLINIC | Age: 65
End: 2025-06-10
Payer: MEDICARE

## 2025-06-10 VITALS
SYSTOLIC BLOOD PRESSURE: 104 MMHG | WEIGHT: 171 LBS | HEART RATE: 76 BPM | DIASTOLIC BLOOD PRESSURE: 62 MMHG | HEIGHT: 71 IN | OXYGEN SATURATION: 93 % | BODY MASS INDEX: 23.94 KG/M2

## 2025-06-10 DIAGNOSIS — J06.9 VIRAL URI WITH COUGH: ICD-10-CM

## 2025-06-10 DIAGNOSIS — L60.9 NAIL ABNORMALITIES: ICD-10-CM

## 2025-06-10 DIAGNOSIS — J02.9 SORE THROAT: Primary | ICD-10-CM

## 2025-06-10 DIAGNOSIS — H26.9 MILD CATARACT: ICD-10-CM

## 2025-06-10 PROBLEM — C34.90 LUNG CANCER (HCC): Status: RESOLVED | Noted: 2022-08-03 | Resolved: 2025-06-10

## 2025-06-10 PROCEDURE — 87880 STREP A ASSAY W/OPTIC: CPT | Performed by: INTERNAL MEDICINE

## 2025-06-10 PROCEDURE — G2211 COMPLEX E/M VISIT ADD ON: HCPCS | Performed by: INTERNAL MEDICINE

## 2025-06-10 PROCEDURE — 99213 OFFICE O/P EST LOW 20 MIN: CPT | Performed by: INTERNAL MEDICINE

## 2025-06-10 RX ORDER — FLUTICASONE PROPIONATE 50 MCG
2 SPRAY, SUSPENSION (ML) NASAL DAILY
Qty: 1 G | Refills: 0 | Status: SHIPPED | OUTPATIENT
Start: 2025-06-10

## 2025-06-10 RX ORDER — GUAIFENESIN 600 MG/1
1200 TABLET, EXTENDED RELEASE ORAL EVERY 12 HOURS PRN
Qty: 20 TABLET | Refills: 0 | Status: SHIPPED | OUTPATIENT
Start: 2025-06-10

## 2025-06-10 NOTE — PROGRESS NOTES
Name: Clinton Wright      : 1960      MRN: [id number]  Encounter Provider: Vicente Barnard DO  Encounter Date: 6/10/2025   Enc[facility]: [facility]  :  Assessment & Plan  Sore throat  Resolved likely viral rapid strep negative fluids rest monitor if any change worse or symptoms not jayden please let me know  Orders:  •  POCT rapid ANTIGEN strepA    Viral URI with cough  Symptoms are resolving at this point residual nasal congestion and postnasal drip Rx for Flonase/Mucinex as directed as needed lung examination clear to auscultation  Orders:  •  fluticasone (FLONASE) 50 mcg/act nasal spray; 2 sprays into each nostril daily  •  guaiFENesin (MUCINEX) 600 mg 12 hr tablet; Take 2 tablets (1,200 mg total) by mouth every 12 (twelve) hours as needed for cough  Patient does have a history of lung cancer and has follow-up with [facility] and will be going for a CAT scan in the near future we will continue to monitor his symptoms if the symptoms change worsen or do not jayden please notify me  Mild cataract  Mild cataract on exam no symptoms discussed with his son who is an ophthalmologist       Nail abnormalities  Chronic clubbing of the nails likely related to his previous history of lung cancer and have been present for many years also there is some longitudinal lines of the bilateral thumbs I did review the differential including fungal infection I did offer him further evaluation including fungal culture at this point in time he like to hold off on evaluation we will monitor if any problems he will let us know or if he like evaluation he will let us know he can follow-up with his primary care regarding       RTO as scheduled call if any problems       History of Present Illness   HPI 64-year old male coming in for a follow up visit regarding viral upper respiratory tract infection with cough, sore throat, mild cataracts bilateral and abnormal nail findings chronic; patient  "does report to me nasal congestion postnasal drip sore throat 2 weeks ago the symptoms resolved as of yesterday no fevers no chills no swollen glands no known exposures he was traveling to [place].  He did not take any medicines.  [facility] [place], he does have chronic clubbing of bilateral nails for many years and has a history of lung cancer being monitored through [facility] and underwent surgical intervention in the past he also has longitudinal lines of his thumbs like to hold off on evaluation at this point  Review of Systems   Constitutional:  Negative for appetite change, chills and fever.   HENT:  Positive for congestion and postnasal drip. Negative for ear pain, rhinorrhea, sinus pressure, sinus pain, sneezing and sore throat.    Eyes:  Negative for itching.   Respiratory:  Positive for cough. Negative for shortness of breath and wheezing.    Gastrointestinal:  Negative for diarrhea, nausea and vomiting.   Neurological:  Negative for headaches.       Objective   /62 (BP Location: Left arm, Patient Position: Sitting, Cuff Size: Standard)   Pulse 76   Ht 5' 11\" (1.803 m)   Wt 77.6 kg (171 lb)   SpO2 93%   BMI 23.85 kg/m²   Mild bilateral cataract, longitudinal lines of bilateral thumb nails clubbing bilateral nails minimal postnasal drip no erythema no exudate normal airway.  Nasal congestion  Physical Exam  Vitals and nursing note reviewed.   Constitutional:       General: He is not in acute distress.     Appearance: Normal appearance. He is well-developed. He is not ill-appearing, toxic-appearing or diaphoretic.   HENT:      Head: Normocephalic and atraumatic.      Right Ear: External ear normal.      Left Ear: External ear normal.      Nose: Congestion present.      Mouth/Throat:      Pharynx: Oropharynx is clear. No oropharyngeal exudate or posterior oropharyngeal erythema.     Eyes:      General: No scleral icterus.        Right eye: No discharge.         Left eye: No discharge.      " Conjunctiva/sclera: Conjunctivae normal.      Pupils: Pupils are equal, round, and reactive to light.       Cardiovascular:      Heart sounds: Normal heart sounds. No murmur heard.  Pulmonary:      Effort: No respiratory distress.      Breath sounds: No wheezing or rales.   Lymphadenopathy:      Cervical: No cervical adenopathy.     Neurological:      Mental Status: He is alert.

## 2025-06-11 ENCOUNTER — TELEPHONE (OUTPATIENT)
Dept: INTERNAL MEDICINE CLINIC | Facility: CLINIC | Age: 65
End: 2025-06-11

## 2025-06-11 LAB — S PYO AG THROAT QL: NEGATIVE

## 2025-06-11 RX ORDER — GUAIFENESIN 600 MG/1
1200 TABLET, EXTENDED RELEASE ORAL EVERY 12 HOURS PRN
Qty: 20 TABLET | Refills: 0 | OUTPATIENT
Start: 2025-06-11